# Patient Record
Sex: MALE | Race: WHITE | Employment: FULL TIME | URBAN - METROPOLITAN AREA
[De-identification: names, ages, dates, MRNs, and addresses within clinical notes are randomized per-mention and may not be internally consistent; named-entity substitution may affect disease eponyms.]

---

## 2018-02-16 PROBLEM — E66.01 MORBID OBESITY WITH BMI OF 50.0-59.9, ADULT (HCC): Status: ACTIVE | Noted: 2018-02-16

## 2018-03-16 ENCOUNTER — INITIAL CONSULT (OUTPATIENT)
Dept: BARIATRICS/WEIGHT MGMT | Age: 33
End: 2018-03-16
Payer: COMMERCIAL

## 2018-03-16 ENCOUNTER — OFFICE VISIT (OUTPATIENT)
Dept: BARIATRICS/WEIGHT MGMT | Age: 33
End: 2018-03-16
Payer: COMMERCIAL

## 2018-03-16 VITALS
BODY MASS INDEX: 42.66 KG/M2 | HEIGHT: 72 IN | WEIGHT: 315 LBS | RESPIRATION RATE: 18 BRPM | HEART RATE: 70 BPM | DIASTOLIC BLOOD PRESSURE: 80 MMHG | TEMPERATURE: 96.8 F | SYSTOLIC BLOOD PRESSURE: 130 MMHG

## 2018-03-16 VITALS — HEIGHT: 72 IN | WEIGHT: 315 LBS | BODY MASS INDEX: 42.66 KG/M2

## 2018-03-16 DIAGNOSIS — K76.0 FATTY LIVER: ICD-10-CM

## 2018-03-16 DIAGNOSIS — I87.8 VENOUS STASIS: ICD-10-CM

## 2018-03-16 DIAGNOSIS — E55.9 VITAMIN D DEFICIENCY: ICD-10-CM

## 2018-03-16 DIAGNOSIS — I73.9 PERIPHERAL VASCULAR DISEASE (HCC): Primary | ICD-10-CM

## 2018-03-16 DIAGNOSIS — Z71.3 DIETARY COUNSELING: Primary | ICD-10-CM

## 2018-03-16 DIAGNOSIS — K44.9 HIATAL HERNIA: ICD-10-CM

## 2018-03-16 DIAGNOSIS — E53.8 FOLATE DEFICIENCY: ICD-10-CM

## 2018-03-16 PROCEDURE — G8417 CALC BMI ABV UP PARAM F/U: HCPCS | Performed by: SURGERY

## 2018-03-16 PROCEDURE — G8484 FLU IMMUNIZE NO ADMIN: HCPCS | Performed by: SURGERY

## 2018-03-16 PROCEDURE — G8427 DOCREV CUR MEDS BY ELIG CLIN: HCPCS | Performed by: SURGERY

## 2018-03-16 PROCEDURE — 99212 OFFICE O/P EST SF 10 MIN: CPT

## 2018-03-16 PROCEDURE — 1036F TOBACCO NON-USER: CPT | Performed by: SURGERY

## 2018-03-16 PROCEDURE — 99999 PR OFFICE/OUTPT VISIT,PROCEDURE ONLY: CPT | Performed by: SURGERY

## 2018-03-16 PROCEDURE — 99214 OFFICE O/P EST MOD 30 MIN: CPT | Performed by: SURGERY

## 2018-03-16 NOTE — PROGRESS NOTES
David Mello  3/16/2018  [address]     Post-EGD Follow-up. Subjective:   David Mello is a 29 y.o. male post EGD done 2 weeks ago. He did have a hiatal hernia, did not have gastritis. Biopsy did not show Helicobacter pylori. The gallbladder ultrasound showed no stones. Weight: (!) 398 lb (180.5 kg). Physical exam:    /80 (Site: Left Arm, Position: Sitting, Cuff Size: Large Adult)   Pulse 70   Temp 96.8 °F (36 °C) (Temporal)   Resp 18   Ht 6' (1.829 m)   Wt (!) 398 lb (180.5 kg)   BMI 53.98 kg/m²   General appearance: alert, appears stated age and cooperative  Lungs: clear to auscultation bilaterally  Heart: regular rate and rhythm  Abdomen: soft, non-tender; bowel sounds normal; no masses,  no organomegaly  Ext: venous stasis bilateral LE    Assessment:    Doing well two weeks post EGD    Plan:   Stay on the high protein, low calorie diet while waiting for insurance approval. Walk as much as possible but keep your legs elevated when sitting. Continue deep breathing exercizes. Aim for 60 gm Protein and 90 oz of liquids per day. Track protein and fluid intake. Make sure the bowel move daily by taking fiber such as Metamucil. We discussed results and surgery for over 15 minutes.     Rule out DVT    Physician Signature: Electronically signed by Dr. Marly Pan MD   Cc:  Victor Hugo Pacheco MD

## 2018-03-19 ENCOUNTER — TELEPHONE (OUTPATIENT)
Dept: BARIATRICS/WEIGHT MGMT | Age: 33
End: 2018-03-19

## 2018-03-19 NOTE — TELEPHONE ENCOUNTER
Spoke with patient to confirm appt for ultra sound of legs set for 4/10/18 @ 2:30 ( arrival of 2:00pm) at [place] [address] in [facility]. Precert not needed from [employer], Ref Number [id number], spoke with Vimal Macdonald.

## 2018-03-29 ENCOUNTER — OFFICE VISIT (OUTPATIENT)
Dept: CARDIOLOGY CLINIC | Age: 33
End: 2018-03-29
Payer: COMMERCIAL

## 2018-03-29 VITALS
BODY MASS INDEX: 42.66 KG/M2 | WEIGHT: 315 LBS | HEART RATE: 78 BPM | DIASTOLIC BLOOD PRESSURE: 86 MMHG | HEIGHT: 72 IN | RESPIRATION RATE: 20 BRPM | SYSTOLIC BLOOD PRESSURE: 128 MMHG

## 2018-03-29 DIAGNOSIS — Z01.818 PRE-OP EXAMINATION: Primary | ICD-10-CM

## 2018-03-29 PROCEDURE — G8427 DOCREV CUR MEDS BY ELIG CLIN: HCPCS | Performed by: INTERNAL MEDICINE

## 2018-03-29 PROCEDURE — 99204 OFFICE O/P NEW MOD 45 MIN: CPT | Performed by: INTERNAL MEDICINE

## 2018-03-29 PROCEDURE — G8484 FLU IMMUNIZE NO ADMIN: HCPCS | Performed by: INTERNAL MEDICINE

## 2018-03-29 PROCEDURE — G8417 CALC BMI ABV UP PARAM F/U: HCPCS | Performed by: INTERNAL MEDICINE

## 2018-03-29 PROCEDURE — 1036F TOBACCO NON-USER: CPT | Performed by: INTERNAL MEDICINE

## 2018-03-29 PROCEDURE — 93000 ELECTROCARDIOGRAM COMPLETE: CPT | Performed by: INTERNAL MEDICINE

## 2018-03-29 NOTE — PROGRESS NOTES
rhythm other than sinus, severe obstructive valvular disease.    Cosme Sieving for surgery at low risk with no additoinal testing needed      Mariaelena Camacho M.D  [facility]

## 2018-04-10 ENCOUNTER — HOSPITAL ENCOUNTER (OUTPATIENT)
Dept: ULTRASOUND IMAGING | Age: 33
Discharge: HOME | End: 2018-04-12
Payer: COMMERCIAL

## 2018-04-10 ENCOUNTER — INITIAL CONSULT (OUTPATIENT)
Dept: BARIATRICS/WEIGHT MGMT | Age: 33
End: 2018-04-10
Payer: COMMERCIAL

## 2018-04-10 VITALS — WEIGHT: 315 LBS | BODY MASS INDEX: 42.66 KG/M2 | HEIGHT: 72 IN

## 2018-04-10 DIAGNOSIS — I87.8 VENOUS STASIS: ICD-10-CM

## 2018-04-10 DIAGNOSIS — K76.0 FATTY LIVER: ICD-10-CM

## 2018-04-10 DIAGNOSIS — I73.9 PERIPHERAL VASCULAR DISEASE (HCC): ICD-10-CM

## 2018-04-10 DIAGNOSIS — K44.9 HIATAL HERNIA: ICD-10-CM

## 2018-04-10 DIAGNOSIS — Z71.3 DIETARY COUNSELING: Primary | ICD-10-CM

## 2018-04-10 PROCEDURE — 93970 EXTREMITY STUDY: CPT

## 2018-04-10 PROCEDURE — 99999 PR OFFICE/OUTPT VISIT,PROCEDURE ONLY: CPT | Performed by: SURGERY

## 2018-05-08 ENCOUNTER — INITIAL CONSULT (OUTPATIENT)
Dept: BARIATRICS/WEIGHT MGMT | Age: 33
End: 2018-05-08
Payer: COMMERCIAL

## 2018-05-08 VITALS — BODY MASS INDEX: 53.03 KG/M2 | WEIGHT: 315 LBS

## 2018-05-08 DIAGNOSIS — Z71.3 DIETARY COUNSELING: Primary | ICD-10-CM

## 2018-05-08 PROCEDURE — 99999 PR OFFICE/OUTPT VISIT,PROCEDURE ONLY: CPT | Performed by: SURGERY

## 2018-06-19 ENCOUNTER — INITIAL CONSULT (OUTPATIENT)
Dept: BARIATRICS/WEIGHT MGMT | Age: 33
End: 2018-06-19
Payer: COMMERCIAL

## 2018-06-19 DIAGNOSIS — Z71.3 DIETARY COUNSELING: Primary | ICD-10-CM

## 2018-06-19 PROCEDURE — 99999 PR OFFICE/OUTPT VISIT,PROCEDURE ONLY: CPT | Performed by: SURGERY

## 2018-06-22 VITALS — WEIGHT: 315 LBS | BODY MASS INDEX: 50.59 KG/M2

## 2018-07-11 ENCOUNTER — TELEPHONE (OUTPATIENT)
Dept: BARIATRICS/WEIGHT MGMT | Age: 33
End: 2018-07-11

## 2018-07-17 ENCOUNTER — INITIAL CONSULT (OUTPATIENT)
Dept: BARIATRICS/WEIGHT MGMT | Age: 33
End: 2018-07-17
Payer: COMMERCIAL

## 2018-07-17 DIAGNOSIS — Z71.3 NUTRITIONAL COUNSELING: Primary | ICD-10-CM

## 2018-07-17 PROCEDURE — 99999 PR OFFICE/OUTPT VISIT,PROCEDURE ONLY: CPT | Performed by: SURGERY

## 2018-07-20 ENCOUNTER — TELEPHONE (OUTPATIENT)
Dept: BARIATRICS/WEIGHT MGMT | Age: 33
End: 2018-07-20

## 2018-07-25 VITALS — BODY MASS INDEX: 49.37 KG/M2 | WEIGHT: 315 LBS

## 2018-07-25 NOTE — PROGRESS NOTES
with or immediately after      meals and avoiding high caloric empty beverage consumption. Portion control ,meal planning and avoiding empty calorie consumption was reviewed. Pt was encouraged to practice this daily for weight loss success. Graham / Elias reviewed insurance company weight loss requirement on 7/17/18. Pt verbalized understanding. Please be aware at each visit you have been instructed that in order for your insurance company to approve your surgery you must show a consistent weight loss of 2 lbs or greater at each visit. We can not guarantee an approval by your insurance company we can only provide the information given to us it is up to you the patient to show compliancy to your insurance company. If you do gain weight during your supervised weight loss counseling sessions insurance companies starting in 2018 are denying patients for not showing consistent weight loss results when part of a supervised weight loss counseling program.     Pt spent 120 minutes with the Graham Griffin today preparing the patient for pre/post surgical dietary changes.

## 2018-08-24 ENCOUNTER — INITIAL CONSULT (OUTPATIENT)
Dept: BARIATRICS/WEIGHT MGMT | Age: 33
End: 2018-08-24
Payer: COMMERCIAL

## 2018-08-24 ENCOUNTER — TELEPHONE (OUTPATIENT)
Dept: BARIATRICS/WEIGHT MGMT | Age: 33
End: 2018-08-24

## 2018-08-24 VITALS — HEIGHT: 72 IN | BODY MASS INDEX: 42.66 KG/M2 | WEIGHT: 315 LBS

## 2018-08-24 DIAGNOSIS — Z71.3 DIETARY COUNSELING: Primary | ICD-10-CM

## 2018-08-24 PROCEDURE — 99999 PR OFFICE/OUTPT VISIT,PROCEDURE ONLY: CPT | Performed by: SURGERY

## 2018-08-24 NOTE — TELEPHONE ENCOUNTER
Patient here today for dietary appointment. He is still trying to get funding together to proceed with surgery as self pay, but is having difficulty. He asked if I would still submit to insurance and I said that I will once he has completed everything. We also discussed that if insurance denies (as I expect they will) and he cannot get the funding for surgery, he can be scheduled to work with Dr. Lizandro Wolf.

## 2018-09-05 ENCOUNTER — TELEPHONE (OUTPATIENT)
Dept: BARIATRICS/WEIGHT MGMT | Age: 33
End: 2018-09-05

## 2018-09-05 NOTE — TELEPHONE ENCOUNTER
Graham Griffin called pt D/T original last signed dietary note being faxed to PCP on 8/24/18. Graham Griffin refaxed it again on 9/5/18. Graham Griffin called pt. Pt was not available. Graham / Elias LM asking the pt to F/U with his PCP.

## 2018-09-17 ENCOUNTER — TELEPHONE (OUTPATIENT)
Dept: BARIATRICS/WEIGHT MGMT | Age: 33
End: 2018-09-17

## 2018-09-24 ENCOUNTER — TELEPHONE (OUTPATIENT)
Dept: BARIATRICS/WEIGHT MGMT | Age: 33
End: 2018-09-24

## 2018-09-24 NOTE — TELEPHONE ENCOUNTER
As expected, case was denied by [facility] as a non-covered service. Call out to patient to see how he would like to proceed. Message left for him to return my call to discuss.

## 2018-09-26 ENCOUNTER — TELEPHONE (OUTPATIENT)
Dept: BARIATRICS/WEIGHT MGMT | Age: 33
End: 2018-09-26

## 2019-02-01 ENCOUNTER — TELEPHONE (OUTPATIENT)
Dept: BARIATRICS/WEIGHT MGMT | Age: 34
End: 2019-02-01

## 2019-02-15 ENCOUNTER — TELEPHONE (OUTPATIENT)
Dept: BARIATRICS/WEIGHT MGMT | Age: 34
End: 2019-02-15

## 2019-02-15 DIAGNOSIS — E66.01 MORBID OBESITY DUE TO EXCESS CALORIES (HCC): ICD-10-CM

## 2019-02-15 DIAGNOSIS — E46 MALNUTRITION, UNSPECIFIED TYPE (HCC): Primary | ICD-10-CM

## 2019-03-19 ENCOUNTER — HOSPITAL ENCOUNTER (OUTPATIENT)
Age: 34
Discharge: HOME | End: 2019-03-19
Payer: COMMERCIAL

## 2019-03-19 DIAGNOSIS — E46 MALNUTRITION, UNSPECIFIED TYPE (HCC): ICD-10-CM

## 2019-03-19 DIAGNOSIS — E66.01 MORBID OBESITY DUE TO EXCESS CALORIES (HCC): ICD-10-CM

## 2019-03-19 PROCEDURE — 82306 VITAMIN D 25 HYDROXY: CPT

## 2019-03-19 PROCEDURE — 36415 COLL VENOUS BLD VENIPUNCTURE: CPT

## 2019-03-19 PROCEDURE — 82746 ASSAY OF FOLIC ACID SERUM: CPT

## 2019-03-20 LAB
FOLATE: 6.8 NG/ML (ref 4.8–24.2)
VITAMIN D 25-HYDROXY: 16 NG/ML (ref 30–100)

## 2019-04-05 ENCOUNTER — OFFICE VISIT (OUTPATIENT)
Dept: BARIATRICS/WEIGHT MGMT | Age: 34
End: 2019-04-05
Payer: COMMERCIAL

## 2019-04-05 DIAGNOSIS — E55.9 VITAMIN D DEFICIENCY: Primary | ICD-10-CM

## 2019-04-05 PROCEDURE — 99201 PR OFFICE OUTPATIENT NEW 10 MINUTES: CPT | Performed by: INTERNAL MEDICINE

## 2019-04-05 PROCEDURE — 99211 OFF/OP EST MAY X REQ PHY/QHP: CPT | Performed by: INTERNAL MEDICINE

## 2019-04-05 PROCEDURE — G8428 CUR MEDS NOT DOCUMENT: HCPCS | Performed by: INTERNAL MEDICINE

## 2019-04-05 PROCEDURE — G8417 CALC BMI ABV UP PARAM F/U: HCPCS | Performed by: INTERNAL MEDICINE

## 2019-04-05 PROCEDURE — 1036F TOBACCO NON-USER: CPT | Performed by: INTERNAL MEDICINE

## 2019-04-05 RX ORDER — CHOLECALCIFEROL (VITAMIN D3) 1250 MCG
CAPSULE ORAL
Qty: 12 CAPSULE | Refills: 0 | Status: SHIPPED | OUTPATIENT
Start: 2019-04-05 | End: 2019-05-24 | Stop reason: DRUGHIGH

## 2019-04-05 NOTE — PATIENT INSTRUCTIONS
Begin taking Vitamin D3 50,000 IU, one capsule twice each week for 6 weeks. Return to see Dr. Dorothy Castañeda in 7 weeks  Repeat Vit D 25OH level 2-3 days prior to appointment with Dr. Dorothy Castañeda  Do not have the above lab drawn on the same day as you take one of the vitamin D capsules.

## 2019-04-05 NOTE — PROGRESS NOTES
Date of Encounter: 4/5/19    Chief Complaint: Low vitamin D    HPI:     Ilda Davis presents to the clinic today for evaluation and treatment of a low vitamin D level. He is preparing for a bariatric procedure and needs his level normalized prior to proceeding. Lab and treatment history are as follows:   Date     Vit D-OH level    Treatment prescribed    3/19/2019     16   None yet    PE:     There were no vitals taken for this visit. Pt is WN, WD, and in NAD    A/P:   1. Low Vitamin D - uncontrolled   Vitamin D3 50k IU twice each week.     2.  Follow up          Return to see me in 7 weeks          Repeat Vit D 25OH level 2-3 days prior to appt with me    Wade Burton MD,   Endocrinology/Obesity  4/5/19

## 2019-05-22 ENCOUNTER — HOSPITAL ENCOUNTER (OUTPATIENT)
Age: 34
Discharge: HOME | End: 2019-05-22
Payer: COMMERCIAL

## 2019-05-22 PROCEDURE — 36415 COLL VENOUS BLD VENIPUNCTURE: CPT

## 2019-05-22 PROCEDURE — 82306 VITAMIN D 25 HYDROXY: CPT

## 2019-05-23 LAB — VITAMIN D 25-HYDROXY: 40 NG/ML (ref 30–100)

## 2019-05-24 ENCOUNTER — OFFICE VISIT (OUTPATIENT)
Dept: BARIATRICS/WEIGHT MGMT | Age: 34
End: 2019-05-24
Payer: COMMERCIAL

## 2019-05-24 VITALS — WEIGHT: 315 LBS | HEIGHT: 72 IN | BODY MASS INDEX: 42.66 KG/M2

## 2019-05-24 DIAGNOSIS — E55.9 VITAMIN D DEFICIENCY: Primary | ICD-10-CM

## 2019-05-24 PROCEDURE — 1036F TOBACCO NON-USER: CPT | Performed by: INTERNAL MEDICINE

## 2019-05-24 PROCEDURE — G8428 CUR MEDS NOT DOCUMENT: HCPCS | Performed by: INTERNAL MEDICINE

## 2019-05-24 PROCEDURE — 99212 OFFICE O/P EST SF 10 MIN: CPT | Performed by: INTERNAL MEDICINE

## 2019-05-24 PROCEDURE — G8417 CALC BMI ABV UP PARAM F/U: HCPCS | Performed by: INTERNAL MEDICINE

## 2019-05-24 NOTE — PATIENT INSTRUCTIONS
Begin taking Vitamin D3 5,000 IU, one capsule daily until surgery  Return to see Dr. Sarahi Dickens as needed

## 2019-05-24 NOTE — PROGRESS NOTES
Date of Encounter: 5/24/19    Chief Complaint: Low vitamin D    HPI:     Efra Cole presents to the clinic today for evaluation and treatment of a low vitamin D level. He is preparing for a bariatric procedure and needs his level normalized prior to proceeding. Lab and treatment history are as follows:   Date     Vit D-OH level    Treatment prescribed    3/19/2019     16   Vitamin D3 50k IU twice each week for 6 weeks   5/22/2019     40   None    ROS: no hematuria or constipation    PE:     Ht 6' (1.829 m)   Wt (!) 393 lb 8 oz (178.5 kg)   BMI 53.37 kg/m²     Pt is WN, WD, and in NAD    A/P:   1. Low Vitamin D - controlled   Vitamin D3 5k daily until surgery.     2.  Follow up          See me back as needed    Sweta Sanchez MD,   Endocrinology/Obesity  5/24/19

## 2019-05-31 ENCOUNTER — OFFICE VISIT (OUTPATIENT)
Dept: BARIATRICS/WEIGHT MGMT | Age: 34
End: 2019-05-31
Payer: COMMERCIAL

## 2019-05-31 VITALS
SYSTOLIC BLOOD PRESSURE: 140 MMHG | DIASTOLIC BLOOD PRESSURE: 87 MMHG | HEIGHT: 72 IN | WEIGHT: 315 LBS | RESPIRATION RATE: 20 BRPM | TEMPERATURE: 95.8 F | HEART RATE: 72 BPM | BODY MASS INDEX: 42.66 KG/M2

## 2019-05-31 DIAGNOSIS — K91.2 MALNUTRITION FOLLOWING GASTROINTESTINAL SURGERY: Primary | ICD-10-CM

## 2019-05-31 PROCEDURE — G8427 DOCREV CUR MEDS BY ELIG CLIN: HCPCS | Performed by: SURGERY

## 2019-05-31 PROCEDURE — G8417 CALC BMI ABV UP PARAM F/U: HCPCS | Performed by: SURGERY

## 2019-05-31 PROCEDURE — 1036F TOBACCO NON-USER: CPT | Performed by: SURGERY

## 2019-05-31 PROCEDURE — 99213 OFFICE O/P EST LOW 20 MIN: CPT | Performed by: SURGERY

## 2019-05-31 NOTE — PROGRESS NOTES
Ivan Celis  5/31/2019  [facility]    ReEvaluation  Bariatric Surgery       Subjective:  CHIEF COMPLAINT: Morbid obesity, malnutrition, Obstructive Sleep Apnea    HISTORY OF PRESENT ILLNESS: Ivan Celis is a morbidly-obese 34 y.o.  male, who weighs (!) 395 lb (179.2 kg). He was 242 pounds over his ideal body weight. He has lost to 150lbs over and was at one point but regained and is now 230lbs over. The Body mass index is 53.57 kg/m². He has multiple medical problems aggravated by his obesity. He wishes to have bariatric surgery so that he can lose a large amount of weight and keep the weight off. I have met with him in the [facility] where we discussed the surgery in great detail and went over the risks and benefits. He has watched our informational video so he understands all of the extensive risks involved. He states that he understands all of the risks and wishes to proceed with the evaluation. He did well last year until he was denied for coverage from his insurance. He has regained significant weight after he lost 60lbs. He has regained 40lbs sing then. Past Medical History  Patient Active Problem List   Diagnosis    Morbid obesity with BMI of 50.0-59.9, adult ([address]    Fatty liver    Hiatal hernia     Past Surgical History  Past Surgical History:   Procedure Laterality Date    ADENOIDECTOMY      ENDOSCOPY, COLON, DIAGNOSTIC  03/05/2018    with biopsy    TONSILLECTOMY  1999    VASECTOMY  2016    WISDOM TOOTH EXTRACTION  2011      Allergies: Patient has no known allergies. Home Medications  Current Outpatient Medications   Medication Sig Dispense Refill    Cholecalciferol (VITAMIN D3) 5000 units CAPS Take one capsule every day 180 capsule 1     No current facility-administered medications for this visit. Social History:   TOBACCO:   reports that he has never smoked.  He has never used smokeless tobacco.  All smokers must join the free smoking cessation program and stop smoking for 3 months before having any Bariatric surgery. ETOH:    reports that he does not drink alcohol. The patient has a family history that is negative for severe cardiovascular or respiratory issues, negative for reaction to anesthesia. Review of Systems    A complete 10 system review was performed and are otherwise negative unless mentioned in the above HPI. Specific negatives are listed below but may not include all those reviewed.     General ROS: negative obtundation, AMS  ENT ROS: negative rhinorrhea, epistaxis  Allergy and Immunology ROS: negative itchy/watery eyes or nasal congestion  Hematological and Lymphatic ROS: negative spontaneous bleeding or bruising  Endocrine ROS: negative  lethargy, mood swings, palpitations or polydipsia/polyuria  Respiratory ROS: negative sputum changes, stridor, tachypnea or wheezing  Cardiovascular ROS: negative for - loss of consciousness, murmur or orthopnea  Gastrointestinal ROS: negative for - hematochezia or hematemesis  Genito-Urinary ROS: negative for -  genital discharge or hematuria  Musculoskeletal ROS: negative for - focal weakness, gangrene  Psych/Neuro ROS: negative for - visual or auditory hallucinations, suicidal ideation        Physical Exam:   VITALS: BP (!) 140/87 (Site: Left Upper Arm, Position: Sitting, Cuff Size: Large Adult)   Pulse 72   Temp 95.8 °F (35.4 °C) (Temporal)   Resp 20   Ht 6' (1.829 m)   Wt (!) 395 lb (179.2 kg)   BMI 53.57 kg/m²   General appearance: alert, appears stated age and cooperative  Head: Normocephalic, without obvious abnormality, atraumatic  Neck: no adenopathy, supple  Lungs: clear to auscultation bilaterally  Heart: regular rate and rhythm  Abdomen: soft, non-tender; bowel sounds normal; no masses,  no organomegaly  Extremities: extremities normal, atraumatic, no cyanosis or edema  Psych: no hallucinations    Assessment:  Morbid obesity with inability to keep the weight off with diet and exercise. He is interested in Laparoscopic Keyshawn-en- Y Gastric Bypass or Sleeve Gastrectomy. We discussed that our goal is to ameliorate the medical problems and not to obtain a specific body mass index. He understands the risks and benefits, and wishes to proceed with the evaluation. Plan:  Psych evaluation, medical and cardio/pulmonary clearance have been completed. I do not require and further testing for surgery. Vit D has been corrected. Once he arranges his coverage and finances we can proceed with Lap sleeve gastrectomy.     Physician Signature: Electronically signed by Dr. Lisset Sanchez MD    Send copy of H&P to PCP, Rashmi Coppola MD

## 2019-05-31 NOTE — PATIENT INSTRUCTIONS
What is the next step to proceed with weight loss surgery? Please be aware that any co-pays or deductibles may be requested prior to testing and / or procedures. You will need to schedule a psychological evaluation for weight loss surgery. Patients will be required to complete all psychological testing as required by the psychologist. Patients must follow all of the psychologist's recommendations before weight loss surgery can be scheduled. The evaluation must be done a standard way for weight loss surgery. We strongly recommend that you contact one of our preferred providers listed below to arrange this:    Dr. Nicole Yang, PhD [address]        [phone]      Dr. Susanne Gray, PhD   [facility]. [place], [place]         [phone]    You will also need to plan on attending a 2 hour nutrition class at the [facility] prior to your surgery. We will schedule this for you when we schedule your surgery. Please remember to have your labs drawn prior to your scheduled appointment to review the results of your testing. Please remember, that while we will submit your case to insurance for surgery authorization, it is your responsibility to know if your plan covers weight loss surgery and keep up-to-date with changes to your insurance coverage. We will do everything possible to help you get approved for weight loss surgery, but cannot guarantee an approval.     Please note that you will not be submitted to your insurance company until all   pre-operative testing requirements are met.

## 2019-06-14 ENCOUNTER — TELEPHONE (OUTPATIENT)
Dept: BARIATRICS/WEIGHT MGMT | Age: 34
End: 2019-06-14

## 2019-06-14 NOTE — TELEPHONE ENCOUNTER
Per order of Dr. Sarah Tapia patient is ready to be scheduled for LRYGB. He would like his surgery to be scheduled on 07/23/2019. His 2 hour class is scheduled. H&P appointment set. He knows he will need to purchase his supplements at that visit. He has appointment with his PCP and will obtain his final medical clearance. He does not smoke or drink alcohol. We reviewed all med's to avoid 2 weeks prior to surgery. He voiced understanding. Call placed to surgery schedulers and patient placed on google calendar. He is self pay and knows to bring in check at his H&P appointment. Pre op letter mailed to patients home with DVD getting ready for surgery.

## 2019-06-26 ENCOUNTER — INITIAL CONSULT (OUTPATIENT)
Dept: BARIATRICS/WEIGHT MGMT | Age: 34
End: 2019-06-26
Payer: COMMERCIAL

## 2019-06-26 DIAGNOSIS — Z71.3 NUTRITIONAL COUNSELING: Primary | ICD-10-CM

## 2019-06-26 PROCEDURE — 99999 PR OFFICE/OUTPT VISIT,PROCEDURE ONLY: CPT | Performed by: SURGERY

## 2019-06-26 NOTE — LETTER
[facility]   [address] [facility] [address]  Phone: [phone]  Fax: [phone]    Earma Mcburney, RD, LD        July 12, 2019    [address] [place] [address]      Dear Roel Schwarz:        I personally wanted to thank you for selecting [facility] as your center of choice for surgery. I wanted to take the time to let you know it means a lot to me to be able to work with you both before and after surgery and I am so glad that you will become part of our surgical family. It has been a privilege to get to know you and become part of your new journey on life. I look forward to working with you in the future and helping you achieve your new goals. I can't wait to see the growth and transformation that occurs for you after your surgery. If at any time you have any questions you can always contact me [phone].      Respectfully,          Earma Mcburney RDN/ DEN  Bariatric Dietitian  [facility]  Certified In Adult Weight Management I and II  Certified In Pediatric and Adolescent Weight Management        Earma Mcburney, RD, DEN

## 2019-06-27 ENCOUNTER — TELEPHONE (OUTPATIENT)
Dept: BARIATRICS/WEIGHT MGMT | Age: 34
End: 2019-06-27

## 2019-06-27 NOTE — TELEPHONE ENCOUNTER
Patient was in office yesterday for pre-op class. He brought his self pay check for his bariatric surgery scheduled on 7-23-19. Check was sent to [facility] for posting.

## 2019-07-05 ENCOUNTER — OFFICE VISIT (OUTPATIENT)
Dept: BARIATRICS/WEIGHT MGMT | Age: 34
End: 2019-07-05

## 2019-07-05 ENCOUNTER — INITIAL CONSULT (OUTPATIENT)
Dept: BARIATRICS/WEIGHT MGMT | Age: 34
End: 2019-07-05
Payer: COMMERCIAL

## 2019-07-05 VITALS
WEIGHT: 315 LBS | TEMPERATURE: 97.2 F | SYSTOLIC BLOOD PRESSURE: 151 MMHG | BODY MASS INDEX: 42.66 KG/M2 | RESPIRATION RATE: 20 BRPM | DIASTOLIC BLOOD PRESSURE: 80 MMHG | HEIGHT: 72 IN | HEART RATE: 80 BPM

## 2019-07-05 VITALS — WEIGHT: 315 LBS | HEIGHT: 72 IN | BODY MASS INDEX: 42.66 KG/M2

## 2019-07-05 DIAGNOSIS — K21.9 GASTROESOPHAGEAL REFLUX DISEASE WITHOUT ESOPHAGITIS: ICD-10-CM

## 2019-07-05 DIAGNOSIS — K91.2 MALNUTRITION FOLLOWING GASTROINTESTINAL SURGERY: Primary | ICD-10-CM

## 2019-07-05 DIAGNOSIS — Z98.890 PONV (POSTOPERATIVE NAUSEA AND VOMITING): ICD-10-CM

## 2019-07-05 DIAGNOSIS — R11.2 PONV (POSTOPERATIVE NAUSEA AND VOMITING): ICD-10-CM

## 2019-07-05 DIAGNOSIS — Z71.3 DIETARY COUNSELING: Primary | ICD-10-CM

## 2019-07-05 DIAGNOSIS — G89.18 POST-OP PAIN: ICD-10-CM

## 2019-07-05 PROCEDURE — 99999 PR OFFICE/OUTPT VISIT,PROCEDURE ONLY: CPT

## 2019-07-05 PROCEDURE — 99211 OFF/OP EST MAY X REQ PHY/QHP: CPT

## 2019-07-05 PROCEDURE — 99214 OFFICE O/P EST MOD 30 MIN: CPT | Performed by: SURGERY

## 2019-07-05 RX ORDER — OMEPRAZOLE 20 MG/1
20 CAPSULE, DELAYED RELEASE ORAL DAILY
Qty: 30 CAPSULE | Refills: 12 | Status: SHIPPED
Start: 2019-07-05 | End: 2020-07-24

## 2019-07-05 RX ORDER — ONDANSETRON 4 MG/1
4 TABLET, ORALLY DISINTEGRATING ORAL EVERY 8 HOURS PRN
Qty: 10 TABLET | Refills: 0 | Status: SHIPPED | OUTPATIENT
Start: 2019-07-05 | End: 2019-09-06 | Stop reason: ALTCHOICE

## 2019-07-05 RX ORDER — HYDROCODONE BITARTRATE AND ACETAMINOPHEN 5; 325 MG/1; MG/1
1 TABLET ORAL EVERY 6 HOURS PRN
Qty: 10 TABLET | Refills: 0 | Status: SHIPPED | OUTPATIENT
Start: 2019-07-05 | End: 2019-07-08

## 2019-07-05 NOTE — PROGRESS NOTES
guidelines. YES - Patient is instructed to consume 64 ounces of water daily from now until surgery date.  ________________________________________________________________________  Yes - Patient did lose 10% of excess body weight prior to surgery  ________________________________________________________________________  Yes - Patient did purchase 3 month supply of protein, vitamins and  Calcium.   Comments:   [facility] Supplements

## 2019-07-05 NOTE — PATIENT INSTRUCTIONS
[facility]  Dietary Follow-up Appointment Instructions    James Pérez   Date: 7/5/2019     The RD / LD reviewed the following instructions with the patient and handouts have been given. Pt. is able to verbalize instruction and has been instructed to call with any problems or complications. If patient is not able to comply with the dietary or supplement instructions given pt. is instructed to call the [facility] at [phone]. Patient has been instructed to continue to follow a low-fat diet from today's date until the day before surgery. Patient has been instructed to drink 64 oz. of water daily eliminating carbonated and caffeinated beverages. The day before surgery patient will need to follow the bowel prep instructions which consist of a bariatric clear liquid diet, 64 oz of water, 64 oz of the bowel prep and nothing to drink after midnight.  ( See Handout in Booklet)    Bariatric Clear Liquids Allowed:  Diet Jell-O ( No Red or Orange Coloring or Flavoring  Crystal Light, Fruit 2 O (No Red or [employer] or Flavoring)  Water  Broth - Chicken, Beef or Vegetable  Decaffeinated Tea - Plain  Decaffeinated Coffee - Plain  Sugar Free Popsicles (No Red or Orange Coloring or Flavoring)    ________________________________________________________________________  10% Of Excess Body Weight Requirement:      ________________________________________________________________________  3 Month Supply of Supplements:    Supplements purchased at [facility]

## 2019-07-12 ENCOUNTER — HOSPITAL ENCOUNTER (OUTPATIENT)
Dept: PREADMISSION TESTING | Age: 34
Discharge: HOME | End: 2019-07-12
Payer: COMMERCIAL

## 2019-07-12 VITALS
OXYGEN SATURATION: 97 % | SYSTOLIC BLOOD PRESSURE: 129 MMHG | HEIGHT: 72 IN | WEIGHT: 315 LBS | RESPIRATION RATE: 20 BRPM | DIASTOLIC BLOOD PRESSURE: 69 MMHG | BODY MASS INDEX: 42.66 KG/M2 | HEART RATE: 73 BPM | TEMPERATURE: 97.9 F

## 2019-07-12 DIAGNOSIS — K91.2 MALNUTRITION FOLLOWING GASTROINTESTINAL SURGERY: ICD-10-CM

## 2019-07-12 LAB
ALBUMIN SERPL-MCNC: 4.1 G/DL (ref 3.5–5.2)
ALP BLD-CCNC: 92 U/L (ref 40–129)
ALT SERPL-CCNC: 63 U/L (ref 0–40)
ANION GAP SERPL CALCULATED.3IONS-SCNC: 14 MMOL/L (ref 7–16)
AST SERPL-CCNC: 36 U/L (ref 0–39)
BILIRUB SERPL-MCNC: 0.6 MG/DL (ref 0–1.2)
BUN BLDV-MCNC: 11 MG/DL (ref 6–20)
CALCIUM SERPL-MCNC: 9.2 MG/DL (ref 8.6–10.2)
CHLORIDE BLD-SCNC: 100 MMOL/L (ref 98–107)
CO2: 26 MMOL/L (ref 22–29)
CREAT SERPL-MCNC: 1 MG/DL (ref 0.7–1.2)
GFR AFRICAN AMERICAN: >60
GFR NON-AFRICAN AMERICAN: >60 ML/MIN/1.73
GLUCOSE BLD-MCNC: 96 MG/DL (ref 74–99)
HCT VFR BLD CALC: 44.6 % (ref 37–54)
HEMOGLOBIN: 14.6 G/DL (ref 12.5–16.5)
MCH RBC QN AUTO: 27.7 PG (ref 26–35)
MCHC RBC AUTO-ENTMCNC: 32.7 % (ref 32–34.5)
MCV RBC AUTO: 84.5 FL (ref 80–99.9)
PDW BLD-RTO: 13.6 FL (ref 11.5–15)
PLATELET # BLD: 256 E9/L (ref 130–450)
PMV BLD AUTO: 10.9 FL (ref 7–12)
POTASSIUM SERPL-SCNC: 4 MMOL/L (ref 3.5–5)
RBC # BLD: 5.28 E12/L (ref 3.8–5.8)
SODIUM BLD-SCNC: 140 MMOL/L (ref 132–146)
TOTAL PROTEIN: 8.1 G/DL (ref 6.4–8.3)
WBC # BLD: 10.3 E9/L (ref 4.5–11.5)

## 2019-07-12 PROCEDURE — 85027 COMPLETE CBC AUTOMATED: CPT

## 2019-07-12 PROCEDURE — 36415 COLL VENOUS BLD VENIPUNCTURE: CPT

## 2019-07-12 PROCEDURE — 80053 COMPREHEN METABOLIC PANEL: CPT

## 2019-07-22 ENCOUNTER — ANESTHESIA EVENT (OUTPATIENT)
Dept: OPERATING ROOM | Age: 34
DRG: 621 | End: 2019-07-22

## 2019-07-23 ENCOUNTER — ANESTHESIA (OUTPATIENT)
Dept: OPERATING ROOM | Age: 34
DRG: 621 | End: 2019-07-23

## 2019-07-23 ENCOUNTER — HOSPITAL ENCOUNTER (INPATIENT)
Age: 34
LOS: 1 days | Discharge: HOME | DRG: 621 | End: 2019-07-24
Attending: SURGERY | Admitting: SURGERY
Payer: COMMERCIAL

## 2019-07-23 VITALS
RESPIRATION RATE: 3 BRPM | DIASTOLIC BLOOD PRESSURE: 77 MMHG | OXYGEN SATURATION: 87 % | SYSTOLIC BLOOD PRESSURE: 124 MMHG

## 2019-07-23 PROBLEM — Z90.3 S/P PARTIAL GASTRECTOMY: Status: ACTIVE | Noted: 2019-07-23

## 2019-07-23 PROCEDURE — 6360000002 HC RX W HCPCS

## 2019-07-23 PROCEDURE — 1200000000 HC SEMI PRIVATE

## 2019-07-23 PROCEDURE — 0DJ08ZZ INSPECTION OF UPPER INTESTINAL TRACT, VIA NATURAL OR ARTIFICIAL OPENING ENDOSCOPIC: ICD-10-PCS | Performed by: SURGERY

## 2019-07-23 PROCEDURE — 7100000000 HC PACU RECOVERY - FIRST 15 MIN: Performed by: SURGERY

## 2019-07-23 PROCEDURE — 6360000002 HC RX W HCPCS: Performed by: SURGERY

## 2019-07-23 PROCEDURE — 43644 LAP GASTRIC BYPASS/ROUX-EN-Y: CPT | Performed by: SURGERY

## 2019-07-23 PROCEDURE — 3700000001 HC ADD 15 MINUTES (ANESTHESIA): Performed by: SURGERY

## 2019-07-23 PROCEDURE — C9113 INJ PANTOPRAZOLE SODIUM, VIA: HCPCS | Performed by: SURGERY

## 2019-07-23 PROCEDURE — S2900 ROBOTIC SURGICAL SYSTEM: HCPCS | Performed by: SURGERY

## 2019-07-23 PROCEDURE — 2580000003 HC RX 258

## 2019-07-23 PROCEDURE — 2500000003 HC RX 250 WO HCPCS

## 2019-07-23 PROCEDURE — 2500000003 HC RX 250 WO HCPCS: Performed by: SURGERY

## 2019-07-23 PROCEDURE — 6360000002 HC RX W HCPCS: Performed by: ANESTHESIOLOGY

## 2019-07-23 PROCEDURE — 2580000003 HC RX 258: Performed by: SURGERY

## 2019-07-23 PROCEDURE — 0D164ZA BYPASS STOMACH TO JEJUNUM, PERCUTANEOUS ENDOSCOPIC APPROACH: ICD-10-PCS | Performed by: SURGERY

## 2019-07-23 PROCEDURE — 2720000010 HC SURG SUPPLY STERILE: Performed by: SURGERY

## 2019-07-23 PROCEDURE — 88307 TISSUE EXAM BY PATHOLOGIST: CPT

## 2019-07-23 PROCEDURE — 7100000001 HC PACU RECOVERY - ADDTL 15 MIN: Performed by: SURGERY

## 2019-07-23 PROCEDURE — 3600000019 HC SURGERY ROBOT ADDTL 15MIN: Performed by: SURGERY

## 2019-07-23 PROCEDURE — 8E0W4CZ ROBOTIC ASSISTED PROCEDURE OF TRUNK REGION, PERCUTANEOUS ENDOSCOPIC APPROACH: ICD-10-PCS | Performed by: SURGERY

## 2019-07-23 PROCEDURE — 2709999900 HC NON-CHARGEABLE SUPPLY: Performed by: SURGERY

## 2019-07-23 PROCEDURE — 49905 OMENTAL FLAP INTRA-ABDOM: CPT | Performed by: SURGERY

## 2019-07-23 PROCEDURE — 3600000009 HC SURGERY ROBOT BASE: Performed by: SURGERY

## 2019-07-23 PROCEDURE — 6370000000 HC RX 637 (ALT 250 FOR IP): Performed by: SURGERY

## 2019-07-23 PROCEDURE — 0DUL47Z SUPPLEMENT TRANSVERSE COLON WITH AUTOLOGOUS TISSUE SUBSTITUTE, PERCUTANEOUS ENDOSCOPIC APPROACH: ICD-10-PCS | Performed by: SURGERY

## 2019-07-23 PROCEDURE — 3700000000 HC ANESTHESIA ATTENDED CARE: Performed by: SURGERY

## 2019-07-23 RX ORDER — MORPHINE SULFATE 2 MG/ML
2 INJECTION, SOLUTION INTRAMUSCULAR; INTRAVENOUS EVERY 5 MIN PRN
Status: DISCONTINUED | OUTPATIENT
Start: 2019-07-23 | End: 2019-07-23 | Stop reason: HOSPADM

## 2019-07-23 RX ORDER — PROMETHAZINE HYDROCHLORIDE 25 MG/ML
6.25 INJECTION, SOLUTION INTRAMUSCULAR; INTRAVENOUS
Status: DISCONTINUED | OUTPATIENT
Start: 2019-07-23 | End: 2019-07-23 | Stop reason: HOSPADM

## 2019-07-23 RX ORDER — MEPERIDINE HYDROCHLORIDE 25 MG/ML
INJECTION INTRAMUSCULAR; INTRAVENOUS; SUBCUTANEOUS
Status: DISPENSED
Start: 2019-07-23 | End: 2019-07-24

## 2019-07-23 RX ORDER — ONDANSETRON 2 MG/ML
4 INJECTION INTRAMUSCULAR; INTRAVENOUS ONCE
Status: COMPLETED | OUTPATIENT
Start: 2019-07-23 | End: 2019-07-23

## 2019-07-23 RX ORDER — PROCHLORPERAZINE EDISYLATE 5 MG/ML
10 INJECTION INTRAMUSCULAR; INTRAVENOUS EVERY 6 HOURS PRN
Status: DISCONTINUED | OUTPATIENT
Start: 2019-07-23 | End: 2019-07-24 | Stop reason: HOSPADM

## 2019-07-23 RX ORDER — ONDANSETRON 2 MG/ML
4 INJECTION INTRAMUSCULAR; INTRAVENOUS EVERY 6 HOURS PRN
Status: DISCONTINUED | OUTPATIENT
Start: 2019-07-23 | End: 2019-07-24 | Stop reason: HOSPADM

## 2019-07-23 RX ORDER — HYDROMORPHONE HYDROCHLORIDE 1 MG/ML
0.5 INJECTION, SOLUTION INTRAMUSCULAR; INTRAVENOUS; SUBCUTANEOUS EVERY 5 MIN PRN
Status: DISCONTINUED | OUTPATIENT
Start: 2019-07-23 | End: 2019-07-23 | Stop reason: HOSPADM

## 2019-07-23 RX ORDER — NEOSTIGMINE METHYLSULFATE 1 MG/ML
INJECTION, SOLUTION INTRAVENOUS PRN
Status: DISCONTINUED | OUTPATIENT
Start: 2019-07-23 | End: 2019-07-23 | Stop reason: SDUPTHER

## 2019-07-23 RX ORDER — PROMETHAZINE HYDROCHLORIDE 25 MG/ML
6.25 INJECTION, SOLUTION INTRAMUSCULAR; INTRAVENOUS EVERY 6 HOURS PRN
Status: DISCONTINUED | OUTPATIENT
Start: 2019-07-23 | End: 2019-07-24 | Stop reason: HOSPADM

## 2019-07-23 RX ORDER — MIDAZOLAM HYDROCHLORIDE 1 MG/ML
INJECTION INTRAMUSCULAR; INTRAVENOUS PRN
Status: DISCONTINUED | OUTPATIENT
Start: 2019-07-23 | End: 2019-07-23 | Stop reason: SDUPTHER

## 2019-07-23 RX ORDER — GLYCOPYRROLATE 1 MG/5 ML
SYRINGE (ML) INTRAVENOUS PRN
Status: DISCONTINUED | OUTPATIENT
Start: 2019-07-23 | End: 2019-07-23 | Stop reason: SDUPTHER

## 2019-07-23 RX ORDER — KETOROLAC TROMETHAMINE 30 MG/ML
30 INJECTION, SOLUTION INTRAMUSCULAR; INTRAVENOUS EVERY 6 HOURS
Status: DISCONTINUED | OUTPATIENT
Start: 2019-07-23 | End: 2019-07-24 | Stop reason: HOSPADM

## 2019-07-23 RX ORDER — ONDANSETRON 2 MG/ML
INJECTION INTRAMUSCULAR; INTRAVENOUS
Status: COMPLETED
Start: 2019-07-23 | End: 2019-07-23

## 2019-07-23 RX ORDER — BUPIVACAINE HYDROCHLORIDE AND EPINEPHRINE 2.5; 5 MG/ML; UG/ML
INJECTION, SOLUTION EPIDURAL; INFILTRATION; INTRACAUDAL; PERINEURAL PRN
Status: DISCONTINUED | OUTPATIENT
Start: 2019-07-23 | End: 2019-07-23 | Stop reason: ALTCHOICE

## 2019-07-23 RX ORDER — MORPHINE SULFATE 2 MG/ML
2 INJECTION, SOLUTION INTRAMUSCULAR; INTRAVENOUS
Status: DISCONTINUED | OUTPATIENT
Start: 2019-07-23 | End: 2019-07-24 | Stop reason: HOSPADM

## 2019-07-23 RX ORDER — SODIUM CHLORIDE, SODIUM LACTATE, POTASSIUM CHLORIDE, CALCIUM CHLORIDE 600; 310; 30; 20 MG/100ML; MG/100ML; MG/100ML; MG/100ML
INJECTION, SOLUTION INTRAVENOUS CONTINUOUS
Status: DISCONTINUED | OUTPATIENT
Start: 2019-07-23 | End: 2019-07-23 | Stop reason: CLARIF

## 2019-07-23 RX ORDER — SCOLOPAMINE TRANSDERMAL SYSTEM 1 MG/1
1 PATCH, EXTENDED RELEASE TRANSDERMAL
Status: DISCONTINUED | OUTPATIENT
Start: 2019-07-23 | End: 2019-07-24 | Stop reason: HOSPADM

## 2019-07-23 RX ORDER — SODIUM CHLORIDE 0.9 % (FLUSH) 0.9 %
10 SYRINGE (ML) INJECTION PRN
Status: DISCONTINUED | OUTPATIENT
Start: 2019-07-23 | End: 2019-07-24 | Stop reason: HOSPADM

## 2019-07-23 RX ORDER — PANTOPRAZOLE SODIUM 40 MG/10ML
40 INJECTION, POWDER, LYOPHILIZED, FOR SOLUTION INTRAVENOUS DAILY
Status: DISCONTINUED | OUTPATIENT
Start: 2019-07-23 | End: 2019-07-24 | Stop reason: HOSPADM

## 2019-07-23 RX ORDER — LIDOCAINE HYDROCHLORIDE 20 MG/ML
INJECTION, SOLUTION INTRAVENOUS PRN
Status: DISCONTINUED | OUTPATIENT
Start: 2019-07-23 | End: 2019-07-23 | Stop reason: SDUPTHER

## 2019-07-23 RX ORDER — 0.9 % SODIUM CHLORIDE 0.9 %
10 VIAL (ML) INJECTION DAILY
Status: DISCONTINUED | OUTPATIENT
Start: 2019-07-23 | End: 2019-07-24 | Stop reason: HOSPADM

## 2019-07-23 RX ORDER — ROCURONIUM BROMIDE 10 MG/ML
INJECTION, SOLUTION INTRAVENOUS PRN
Status: DISCONTINUED | OUTPATIENT
Start: 2019-07-23 | End: 2019-07-23 | Stop reason: SDUPTHER

## 2019-07-23 RX ORDER — MORPHINE SULFATE 2 MG/ML
INJECTION, SOLUTION INTRAMUSCULAR; INTRAVENOUS
Status: DISPENSED
Start: 2019-07-23 | End: 2019-07-24

## 2019-07-23 RX ORDER — SODIUM CHLORIDE 0.9 % (FLUSH) 0.9 %
10 SYRINGE (ML) INJECTION EVERY 12 HOURS SCHEDULED
Status: DISCONTINUED | OUTPATIENT
Start: 2019-07-23 | End: 2019-07-24 | Stop reason: HOSPADM

## 2019-07-23 RX ORDER — DEXAMETHASONE SODIUM PHOSPHATE 10 MG/ML
INJECTION, SOLUTION INTRAMUSCULAR; INTRAVENOUS PRN
Status: DISCONTINUED | OUTPATIENT
Start: 2019-07-23 | End: 2019-07-23 | Stop reason: SDUPTHER

## 2019-07-23 RX ORDER — OXYCODONE HCL 20 MG/ML
5 CONCENTRATE, ORAL ORAL EVERY 4 HOURS PRN
Status: DISCONTINUED | OUTPATIENT
Start: 2019-07-23 | End: 2019-07-24 | Stop reason: HOSPADM

## 2019-07-23 RX ORDER — MEPERIDINE HYDROCHLORIDE 25 MG/ML
12.5 INJECTION INTRAMUSCULAR; INTRAVENOUS; SUBCUTANEOUS EVERY 5 MIN PRN
Status: COMPLETED | OUTPATIENT
Start: 2019-07-23 | End: 2019-07-23

## 2019-07-23 RX ORDER — SODIUM CHLORIDE 9 MG/ML
INJECTION, SOLUTION INTRAVENOUS CONTINUOUS PRN
Status: DISCONTINUED | OUTPATIENT
Start: 2019-07-23 | End: 2019-07-23 | Stop reason: SDUPTHER

## 2019-07-23 RX ORDER — SODIUM CHLORIDE 9 MG/ML
INJECTION, SOLUTION INTRAVENOUS CONTINUOUS
Status: DISCONTINUED | OUTPATIENT
Start: 2019-07-23 | End: 2019-07-23

## 2019-07-23 RX ORDER — KETOROLAC TROMETHAMINE 30 MG/ML
INJECTION, SOLUTION INTRAMUSCULAR; INTRAVENOUS PRN
Status: DISCONTINUED | OUTPATIENT
Start: 2019-07-23 | End: 2019-07-23 | Stop reason: SDUPTHER

## 2019-07-23 RX ORDER — SCOLOPAMINE TRANSDERMAL SYSTEM 1 MG/1
PATCH, EXTENDED RELEASE TRANSDERMAL
Status: DISPENSED
Start: 2019-07-23 | End: 2019-07-24

## 2019-07-23 RX ORDER — ONDANSETRON 2 MG/ML
INJECTION INTRAMUSCULAR; INTRAVENOUS PRN
Status: DISCONTINUED | OUTPATIENT
Start: 2019-07-23 | End: 2019-07-23 | Stop reason: SDUPTHER

## 2019-07-23 RX ORDER — FENTANYL CITRATE 50 UG/ML
INJECTION, SOLUTION INTRAMUSCULAR; INTRAVENOUS PRN
Status: DISCONTINUED | OUTPATIENT
Start: 2019-07-23 | End: 2019-07-23 | Stop reason: SDUPTHER

## 2019-07-23 RX ORDER — ONDANSETRON 2 MG/ML
4 INJECTION INTRAMUSCULAR; INTRAVENOUS
Status: COMPLETED | OUTPATIENT
Start: 2019-07-23 | End: 2019-07-23

## 2019-07-23 RX ORDER — PROPOFOL 10 MG/ML
INJECTION, EMULSION INTRAVENOUS PRN
Status: DISCONTINUED | OUTPATIENT
Start: 2019-07-23 | End: 2019-07-23 | Stop reason: SDUPTHER

## 2019-07-23 RX ORDER — SODIUM CHLORIDE 0.9 % (FLUSH) 0.9 %
10 SYRINGE (ML) INJECTION PRN
Status: DISCONTINUED | OUTPATIENT
Start: 2019-07-23 | End: 2019-07-23 | Stop reason: HOSPADM

## 2019-07-23 RX ORDER — SODIUM CHLORIDE 0.9 % (FLUSH) 0.9 %
10 SYRINGE (ML) INJECTION EVERY 12 HOURS SCHEDULED
Status: DISCONTINUED | OUTPATIENT
Start: 2019-07-23 | End: 2019-07-23 | Stop reason: HOSPADM

## 2019-07-23 RX ADMIN — MORPHINE SULFATE 2 MG: 2 INJECTION, SOLUTION INTRAMUSCULAR; INTRAVENOUS at 14:05

## 2019-07-23 RX ADMIN — FENTANYL CITRATE 100 MCG: 50 INJECTION, SOLUTION INTRAMUSCULAR; INTRAVENOUS at 12:15

## 2019-07-23 RX ADMIN — FENTANYL CITRATE 50 MCG: 50 INJECTION, SOLUTION INTRAMUSCULAR; INTRAVENOUS at 13:29

## 2019-07-23 RX ADMIN — MEPERIDINE HYDROCHLORIDE 12.5 MG: 25 INJECTION INTRAMUSCULAR; INTRAVENOUS; SUBCUTANEOUS at 13:50

## 2019-07-23 RX ADMIN — PANTOPRAZOLE SODIUM 40 MG: 40 INJECTION, POWDER, LYOPHILIZED, FOR SOLUTION INTRAVENOUS at 18:44

## 2019-07-23 RX ADMIN — MEPERIDINE HYDROCHLORIDE 12.5 MG: 25 INJECTION INTRAMUSCULAR; INTRAVENOUS; SUBCUTANEOUS at 13:45

## 2019-07-23 RX ADMIN — Medication 0.6 MG: at 13:09

## 2019-07-23 RX ADMIN — ONDANSETRON 4 MG: 2 INJECTION INTRAMUSCULAR; INTRAVENOUS at 14:00

## 2019-07-23 RX ADMIN — LIDOCAINE HYDROCHLORIDE 40 MG: 20 INJECTION, SOLUTION INTRAVENOUS at 11:53

## 2019-07-23 RX ADMIN — HYDROMORPHONE HYDROCHLORIDE 0.5 MG: 1 INJECTION, SOLUTION INTRAMUSCULAR; INTRAVENOUS; SUBCUTANEOUS at 14:30

## 2019-07-23 RX ADMIN — CEFAZOLIN 3 G: 10 INJECTION, POWDER, FOR SOLUTION INTRAVENOUS; PARENTERAL at 22:19

## 2019-07-23 RX ADMIN — Medication 10 MG: at 12:13

## 2019-07-23 RX ADMIN — DEXAMETHASONE SODIUM PHOSPHATE 10 MG: 10 INJECTION, SOLUTION INTRAMUSCULAR; INTRAVENOUS at 12:07

## 2019-07-23 RX ADMIN — SODIUM CHLORIDE: 9 INJECTION, SOLUTION INTRAVENOUS at 11:48

## 2019-07-23 RX ADMIN — KETOROLAC TROMETHAMINE 30 MG: 30 INJECTION, SOLUTION INTRAMUSCULAR; INTRAVENOUS at 18:43

## 2019-07-23 RX ADMIN — SODIUM CHLORIDE: 9 INJECTION, SOLUTION INTRAVENOUS at 09:17

## 2019-07-23 RX ADMIN — ONDANSETRON 4 MG: 2 INJECTION INTRAMUSCULAR; INTRAVENOUS at 09:18

## 2019-07-23 RX ADMIN — CEFAZOLIN 3 G: 10 INJECTION, POWDER, FOR SOLUTION INTRAVENOUS; PARENTERAL at 11:58

## 2019-07-23 RX ADMIN — MEPERIDINE HYDROCHLORIDE 12.5 MG: 25 INJECTION INTRAMUSCULAR; INTRAVENOUS; SUBCUTANEOUS at 14:15

## 2019-07-23 RX ADMIN — MORPHINE SULFATE 2 MG: 2 INJECTION, SOLUTION INTRAMUSCULAR; INTRAVENOUS at 14:10

## 2019-07-23 RX ADMIN — FENTANYL CITRATE 50 MCG: 50 INJECTION, SOLUTION INTRAMUSCULAR; INTRAVENOUS at 12:43

## 2019-07-23 RX ADMIN — Medication 10 ML: at 22:20

## 2019-07-23 RX ADMIN — FENTANYL CITRATE 100 MCG: 50 INJECTION, SOLUTION INTRAMUSCULAR; INTRAVENOUS at 11:53

## 2019-07-23 RX ADMIN — Medication 20 MG: at 12:43

## 2019-07-23 RX ADMIN — Medication 3 MG: at 13:09

## 2019-07-23 RX ADMIN — MEPERIDINE HYDROCHLORIDE 12.5 MG: 25 INJECTION INTRAMUSCULAR; INTRAVENOUS; SUBCUTANEOUS at 14:28

## 2019-07-23 RX ADMIN — MIDAZOLAM 2 MG: 1 INJECTION INTRAMUSCULAR; INTRAVENOUS at 11:48

## 2019-07-23 RX ADMIN — PROPOFOL 50 MG: 10 INJECTION, EMULSION INTRAVENOUS at 12:43

## 2019-07-23 RX ADMIN — KETOROLAC TROMETHAMINE 30 MG: 30 INJECTION, SOLUTION INTRAMUSCULAR; INTRAVENOUS at 13:09

## 2019-07-23 RX ADMIN — PROPOFOL 200 MG: 10 INJECTION, EMULSION INTRAVENOUS at 11:53

## 2019-07-23 RX ADMIN — ONDANSETRON 4 MG: 2 INJECTION INTRAMUSCULAR; INTRAVENOUS at 18:47

## 2019-07-23 RX ADMIN — ONDANSETRON HYDROCHLORIDE 4 MG: 2 INJECTION, SOLUTION INTRAMUSCULAR; INTRAVENOUS at 13:07

## 2019-07-23 RX ADMIN — Medication 10 ML: at 18:52

## 2019-07-23 RX ADMIN — POTASSIUM CHLORIDE 125 ML/HR: 2 INJECTION, SOLUTION, CONCENTRATE INTRAVENOUS at 18:52

## 2019-07-23 RX ADMIN — Medication 50 MG: at 11:53

## 2019-07-23 ASSESSMENT — PAIN DESCRIPTION - PROGRESSION
CLINICAL_PROGRESSION: RAPIDLY WORSENING
CLINICAL_PROGRESSION: GRADUALLY WORSENING
CLINICAL_PROGRESSION: NOT CHANGED
CLINICAL_PROGRESSION: GRADUALLY IMPROVING
CLINICAL_PROGRESSION: NOT CHANGED
CLINICAL_PROGRESSION: GRADUALLY IMPROVING
CLINICAL_PROGRESSION: NOT CHANGED
CLINICAL_PROGRESSION: NOT CHANGED
CLINICAL_PROGRESSION: GRADUALLY WORSENING

## 2019-07-23 ASSESSMENT — PULMONARY FUNCTION TESTS
PIF_VALUE: 25
PIF_VALUE: 28
PIF_VALUE: 25
PIF_VALUE: 26
PIF_VALUE: 28
PIF_VALUE: 33
PIF_VALUE: 0
PIF_VALUE: 30
PIF_VALUE: 27
PIF_VALUE: 29
PIF_VALUE: 32
PIF_VALUE: 31
PIF_VALUE: 27
PIF_VALUE: 25
PIF_VALUE: 25
PIF_VALUE: 27
PIF_VALUE: 27
PIF_VALUE: 26
PIF_VALUE: 29
PIF_VALUE: 2
PIF_VALUE: 28
PIF_VALUE: 1
PIF_VALUE: 25
PIF_VALUE: 27
PIF_VALUE: 24
PIF_VALUE: 4
PIF_VALUE: 28
PIF_VALUE: 28
PIF_VALUE: 10
PIF_VALUE: 23
PIF_VALUE: 26
PIF_VALUE: 27
PIF_VALUE: 28
PIF_VALUE: 25
PIF_VALUE: 29
PIF_VALUE: 27
PIF_VALUE: 28
PIF_VALUE: 29
PIF_VALUE: 28
PIF_VALUE: 29
PIF_VALUE: 27
PIF_VALUE: 28
PIF_VALUE: 27
PIF_VALUE: 29
PIF_VALUE: 30
PIF_VALUE: 24
PIF_VALUE: 23
PIF_VALUE: 27
PIF_VALUE: 26
PIF_VALUE: 2
PIF_VALUE: 28
PIF_VALUE: 29
PIF_VALUE: 1
PIF_VALUE: 25
PIF_VALUE: 4
PIF_VALUE: 28
PIF_VALUE: 35
PIF_VALUE: 29
PIF_VALUE: 27
PIF_VALUE: 29
PIF_VALUE: 28
PIF_VALUE: 29
PIF_VALUE: 28
PIF_VALUE: 28
PIF_VALUE: 25
PIF_VALUE: 29
PIF_VALUE: 27
PIF_VALUE: 17
PIF_VALUE: 27
PIF_VALUE: 0
PIF_VALUE: 1
PIF_VALUE: 1
PIF_VALUE: 27
PIF_VALUE: 26
PIF_VALUE: 28
PIF_VALUE: 29
PIF_VALUE: 25
PIF_VALUE: 2
PIF_VALUE: 3
PIF_VALUE: 29
PIF_VALUE: 28
PIF_VALUE: 29
PIF_VALUE: 29
PIF_VALUE: 27
PIF_VALUE: 28
PIF_VALUE: 27

## 2019-07-23 ASSESSMENT — PAIN - FUNCTIONAL ASSESSMENT: PAIN_FUNCTIONAL_ASSESSMENT: 0-10

## 2019-07-23 ASSESSMENT — PAIN DESCRIPTION - FREQUENCY
FREQUENCY: CONTINUOUS
FREQUENCY: INTERMITTENT
FREQUENCY: CONTINUOUS
FREQUENCY: INTERMITTENT
FREQUENCY: INTERMITTENT
FREQUENCY: CONTINUOUS

## 2019-07-23 ASSESSMENT — PAIN DESCRIPTION - PAIN TYPE
TYPE: SURGICAL PAIN

## 2019-07-23 ASSESSMENT — PAIN DESCRIPTION - LOCATION
LOCATION: ABDOMEN

## 2019-07-23 ASSESSMENT — PAIN SCALES - GENERAL
PAINLEVEL_OUTOF10: 3
PAINLEVEL_OUTOF10: 6
PAINLEVEL_OUTOF10: 6
PAINLEVEL_OUTOF10: 5
PAINLEVEL_OUTOF10: 6
PAINLEVEL_OUTOF10: 5
PAINLEVEL_OUTOF10: 7
PAINLEVEL_OUTOF10: 3
PAINLEVEL_OUTOF10: 6
PAINLEVEL_OUTOF10: 6
PAINLEVEL_OUTOF10: 5
PAINLEVEL_OUTOF10: 6
PAINLEVEL_OUTOF10: 6

## 2019-07-23 ASSESSMENT — PAIN DESCRIPTION - ONSET
ONSET: ON-GOING
ONSET: GRADUAL
ONSET: ON-GOING
ONSET: ON-GOING
ONSET: GRADUAL

## 2019-07-23 ASSESSMENT — PAIN DESCRIPTION - DESCRIPTORS
DESCRIPTORS: ACHING;CONSTANT;SORE;TENDER
DESCRIPTORS: ACHING;CRAMPING;TENDER;SORE
DESCRIPTORS: DISCOMFORT;CRAMPING
DESCRIPTORS: ACHING;CONSTANT;TENDER
DESCRIPTORS: DISCOMFORT;DULL;SORE
DESCRIPTORS: ACHING;CONSTANT;CRAMPING;SORE;TENDER
DESCRIPTORS: TENDER;SORE

## 2019-07-23 ASSESSMENT — LIFESTYLE VARIABLES: SMOKING_STATUS: 0

## 2019-07-23 NOTE — OP NOTE
mesenteric defect was closed with a running absorbable barbed suture to prevent internal hernias. The abdomen was filled with saline. A bowel clamp was placed on the jejunum distal to the gastrojejunal anastomosis. An Endoscope was passed down through the mouth. The scope fit past the hiatal hernia repair without problems. The scope was pushed into the pouch. Old blood was removed with suction. The pouch was inflated with air. There was no bubbling from any of the staple lines indicating they were airtight and watertight. The anastomosis was open approximately 9 mm. The scope was easily passed through the anastomosis into the jejunum. All of the mucosa looked healthy. The scope was withdrawn. The omentum was mobilized from the greater curvature of the stomach and the transverse colon using harmonic scalpel. The anastomosis was wrapped with omentum and tacked with a v lock suture. This covered the entire gastrojejunal anastomosis. All of the fluid in the abdomen was removed with suction. All of the CO2 was released. The trocars were removed. Skin wounds were closed with 4-0 Monocryl dermal stitches and Derma+flex glue was applied. The needle and sponge count were correct. The patient tolerated the procedure well and went to recovery in stable condition. Dr Dominguez Nelson was critical in hiatal dissection and performing the anastomosis and upper endoscopy. His assistance was critical in completing the procedure.     Guanako Fox MD

## 2019-07-23 NOTE — ANESTHESIA PRE PROCEDURE
Department of Anesthesiology  Preprocedure Note       Name:  Apple Guerrero   Age:  34 y.o.  :  1985                                          MRN:  [id number]         Date:  2019      Surgeon: Michael Krause):  Gisela Solomon MD    Procedure: GASTRIC BYPASS CHAZ-EN-Y LAPAROSCOPIC ROBOTIC XI (N/A Abdomen)    Medications prior to admission:   Prior to Admission medications    Medication Sig Start Date End Date Taking?  Authorizing Provider   omeprazole (PRILOSEC) 20 MG delayed release capsule Take 1 capsule by mouth Daily 19  Gisela Solomon MD   ondansetron (ZOFRAN ODT) 4 MG disintegrating tablet Take 1 tablet by mouth every 8 hours as needed for Nausea or Vomiting 19   Gisela Solomon MD   Multiple Vitamin (MULTI-VITAMIN DAILY PO) Take 1 tablet by mouth daily    Historical Provider, MD   Cholecalciferol (VITAMIN D3) 5000 units CAPS Take one capsule every day 19   Munira Shirley MD       Current medications:    Current Facility-Administered Medications   Medication Dose Route Frequency Provider Last Rate Last Dose    0.9 % sodium chloride infusion   Intravenous Continuous Gisela Solomon  mL/hr at 19 4102      sodium chloride flush 0.9 % injection 10 mL  10 mL Intravenous 2 times per day Gisela Solomon MD        sodium chloride flush 0.9 % injection 10 mL  10 mL Intravenous PRN Gisela Solomon MD        ceFAZolin (ANCEF) 3 g in dextrose 5 % 100 mL IVPB  3 g Intravenous On Call to Jayy Bailey MD           Allergies:  No Known Allergies    Problem List:    Patient Active Problem List   Diagnosis Code    Morbid obesity with BMI of 50.0-59.9, adult ([facility] [address] E66.01, Z68.43    Fatty liver K76.0    Hiatal hernia K44.9       Past Medical History:        Diagnosis Date    Morbid obesity due to excess calories [facility])        Past Surgical History:        Procedure Laterality Date    ADENOIDECTOMY      ENDOSCOPY, COLON, DIAGNOSTIC  2018    with biopsy    Applicable): No results found for: PREGTESTUR, PREGSERUM, HCG, HCGQUANT     ABGs: No results found for: PHART, PO2ART, SEL7IMJ, VPV2IOC, BEART, D8MPCLLD     Type & Screen (If Applicable):  [facility] found [facility]: [facility]    Anesthesia Evaluation  Patient summary reviewed no history of anesthetic complications:   Airway: Mallampati: II  TM distance: >3 FB   Neck ROM: full  Mouth opening: > = 3 FB Dental: normal exam         Pulmonary: breath sounds clear to auscultation      (-) not a current smoker                           Cardiovascular:Negative CV ROS            Rhythm: regular  Rate: normal                    Neuro/Psych:               GI/Hepatic/Renal:   (+) hiatal hernia, liver disease:, morbid obesity          Endo/Other:                     Abdominal:   (+) obese,         Vascular: negative vascular ROS. Anesthesia Plan      general     ASA 3       Induction: intravenous. MIPS: Postoperative opioids intended and Prophylactic antiemetics administered. Anesthetic plan and risks discussed with patient, spouse and mother. Plan discussed with CRNA. DOS STAFF ADDENDUM:    Pt seen and examined, chart reviewed (including anesthesia, drug and allergy history). Anesthetic plan, risks, benefits, alternatives, and personnel involved discussed with patient. Patient verbalized an understanding and agrees to proceed. Plan discussed with care team members and agreed upon.     Gem Kennedy MD  Staff Anesthesiologist  10:02 AM        Gem Kennedy MD   7/23/2019

## 2019-07-23 NOTE — H&P
Roel Higgins  7/23/2019  [id number]  [address] [facility]  [facility]               History and Physical  Gastric Bypass     CHIEF COMPLAINT: Morbid obesity, GERD    HISTORY OF PRESENT ILLNESS: Roel Higgins is a morbidly-obese 34 y.o.  male, who weighs (!) 375 lb (170.1 kg). He is 205 pounds over his ideal body weight. The Body mass index is 49.27 kg/m². He has lost 43 pounds over the past several months in preparation for surgery. He has multiple medical problems aggravated by his obesity. He wishes to have a gastric bypass so that he can lose more weight and keep the weight off. I have met with him on 2 different occasions in the [facility], where we discussed the surgery in great detail and went over the risks and benefits. He has watched our informational video so he understands all of the extensive risks involved. He states that he understands all of these risks and wishes to proceed. No Known Allergies    No current facility-administered medications on file prior to encounter.       Current Outpatient Medications on File Prior to Encounter   Medication Sig Dispense Refill    Cholecalciferol (VITAMIN D3) 5000 units CAPS Take one capsule every day 180 capsule 1       Past Medical History:   Diagnosis Date    Morbid obesity due to excess calories [facility])        Past Surgical History:   Procedure Laterality Date    ADENOIDECTOMY      ENDOSCOPY, COLON, DIAGNOSTIC  03/05/2018    with biopsy    TONSILLECTOMY  1999    VASECTOMY  2016    WISDOM TOOTH EXTRACTION  2011       Current Facility-Administered Medications   Medication Dose Route Frequency Provider Last Rate Last Dose    0.9 % sodium chloride infusion   Intravenous Continuous Gianna Baird  mL/hr at 07/23/19 0917      sodium chloride flush 0.9 % injection 10 mL  10 mL Intravenous 2 times per day Gianna Baird MD        sodium chloride flush 0.9 % injection 10 mL  10 mL Intravenous PRN limited to: death, anastomotic leak, obstruction, bleeding, and sepsis. Any of these could require further surgery. Other risks include heart attack, DVT, PE, pneumonia, hernia, wound infection, the need for dilatations of the gastrojejunostomy, and the inability to lose appropriate weight and keep it off. We may not be able to do a Gastic Bypass, in that case we will do a Sleeve Gastrectomy. We discussed that our goal is to ameliorate the medical problems and not to obtain a specific body mass index. He understands the risks and benefits and wishes to proceed with the procedure. He has signed a consent form. Plan:  ([id number]) Laparoscopic Keyshawn-en-Y Gastric Bypass possible hiatal hernia repair. He does not need Lovenox post-op. Physician Signature: Electronically signed by Dr. Romel Reyes name on file.     Send copy of H&P to PCP, Elizabeth Keyes MD

## 2019-07-24 VITALS
RESPIRATION RATE: 18 BRPM | TEMPERATURE: 98 F | OXYGEN SATURATION: 97 % | DIASTOLIC BLOOD PRESSURE: 76 MMHG | BODY MASS INDEX: 42.66 KG/M2 | HEIGHT: 72 IN | SYSTOLIC BLOOD PRESSURE: 136 MMHG | WEIGHT: 315 LBS | HEART RATE: 62 BPM

## 2019-07-24 LAB
ALBUMIN SERPL-MCNC: 3.5 G/DL (ref 3.5–5.2)
ALP BLD-CCNC: 85 U/L (ref 40–129)
ALT SERPL-CCNC: 51 U/L (ref 0–40)
ANION GAP SERPL CALCULATED.3IONS-SCNC: 11 MMOL/L (ref 7–16)
AST SERPL-CCNC: 29 U/L (ref 0–39)
BASOPHILS ABSOLUTE: 0.01 E9/L (ref 0–0.2)
BASOPHILS RELATIVE PERCENT: 0.1 % (ref 0–2)
BILIRUB SERPL-MCNC: 0.4 MG/DL (ref 0–1.2)
BILIRUBIN DIRECT: <0.2 MG/DL (ref 0–0.3)
BILIRUBIN, INDIRECT: ABNORMAL MG/DL (ref 0–1)
BUN BLDV-MCNC: 10 MG/DL (ref 6–20)
CALCIUM SERPL-MCNC: 8.4 MG/DL (ref 8.6–10.2)
CHLORIDE BLD-SCNC: 103 MMOL/L (ref 98–107)
CO2: 23 MMOL/L (ref 22–29)
CREAT SERPL-MCNC: 0.9 MG/DL (ref 0.7–1.2)
EOSINOPHILS ABSOLUTE: 0 E9/L (ref 0.05–0.5)
EOSINOPHILS RELATIVE PERCENT: 0 % (ref 0–6)
GFR AFRICAN AMERICAN: >60
GFR NON-AFRICAN AMERICAN: >60 ML/MIN/1.73
GLUCOSE BLD-MCNC: 115 MG/DL (ref 74–99)
HCT VFR BLD CALC: 41.9 % (ref 37–54)
HEMOGLOBIN: 13.5 G/DL (ref 12.5–16.5)
IMMATURE GRANULOCYTES #: 0.04 E9/L
IMMATURE GRANULOCYTES %: 0.3 % (ref 0–5)
LYMPHOCYTES ABSOLUTE: 1.05 E9/L (ref 1.5–4)
LYMPHOCYTES RELATIVE PERCENT: 7.9 % (ref 20–42)
MCH RBC QN AUTO: 28.1 PG (ref 26–35)
MCHC RBC AUTO-ENTMCNC: 32.2 % (ref 32–34.5)
MCV RBC AUTO: 87.3 FL (ref 80–99.9)
MONOCYTES ABSOLUTE: 0.81 E9/L (ref 0.1–0.95)
MONOCYTES RELATIVE PERCENT: 6.1 % (ref 2–12)
NEUTROPHILS ABSOLUTE: 11.36 E9/L (ref 1.8–7.3)
NEUTROPHILS RELATIVE PERCENT: 85.6 % (ref 43–80)
PDW BLD-RTO: 14 FL (ref 11.5–15)
PLATELET # BLD: 271 E9/L (ref 130–450)
PMV BLD AUTO: 10.9 FL (ref 7–12)
POTASSIUM REFLEX MAGNESIUM: 4.4 MMOL/L (ref 3.5–5)
RBC # BLD: 4.8 E12/L (ref 3.8–5.8)
SODIUM BLD-SCNC: 137 MMOL/L (ref 132–146)
TOTAL PROTEIN: 7.1 G/DL (ref 6.4–8.3)
WBC # BLD: 13.3 E9/L (ref 4.5–11.5)

## 2019-07-24 PROCEDURE — 6360000002 HC RX W HCPCS: Performed by: SURGERY

## 2019-07-24 PROCEDURE — 85025 COMPLETE CBC W/AUTO DIFF WBC: CPT

## 2019-07-24 PROCEDURE — 36415 COLL VENOUS BLD VENIPUNCTURE: CPT

## 2019-07-24 PROCEDURE — 80048 BASIC METABOLIC PNL TOTAL CA: CPT

## 2019-07-24 PROCEDURE — 80076 HEPATIC FUNCTION PANEL: CPT

## 2019-07-24 PROCEDURE — 2580000003 HC RX 258: Performed by: SURGERY

## 2019-07-24 PROCEDURE — C9113 INJ PANTOPRAZOLE SODIUM, VIA: HCPCS | Performed by: SURGERY

## 2019-07-24 RX ADMIN — KETOROLAC TROMETHAMINE 30 MG: 30 INJECTION, SOLUTION INTRAMUSCULAR; INTRAVENOUS at 06:49

## 2019-07-24 RX ADMIN — Medication 10 ML: at 08:50

## 2019-07-24 RX ADMIN — PANTOPRAZOLE SODIUM 40 MG: 40 INJECTION, POWDER, LYOPHILIZED, FOR SOLUTION INTRAVENOUS at 08:50

## 2019-07-24 RX ADMIN — ENOXAPARIN SODIUM 40 MG: 40 INJECTION SUBCUTANEOUS at 08:50

## 2019-07-24 RX ADMIN — KETOROLAC TROMETHAMINE 30 MG: 30 INJECTION, SOLUTION INTRAMUSCULAR; INTRAVENOUS at 01:12

## 2019-07-24 RX ADMIN — CEFAZOLIN 3 G: 10 INJECTION, POWDER, FOR SOLUTION INTRAVENOUS; PARENTERAL at 04:09

## 2019-07-24 RX ADMIN — POTASSIUM CHLORIDE 125 ML/HR: 2 INJECTION, SOLUTION, CONCENTRATE INTRAVENOUS at 04:09

## 2019-07-24 ASSESSMENT — PAIN SCALES - GENERAL
PAINLEVEL_OUTOF10: 1
PAINLEVEL_OUTOF10: 0
PAINLEVEL_OUTOF10: 3
PAINLEVEL_OUTOF10: 3

## 2019-07-24 ASSESSMENT — PAIN DESCRIPTION - PAIN TYPE: TYPE: SURGICAL PAIN

## 2019-07-24 ASSESSMENT — PAIN - FUNCTIONAL ASSESSMENT: PAIN_FUNCTIONAL_ASSESSMENT: ACTIVITIES ARE NOT PREVENTED

## 2019-07-24 ASSESSMENT — PAIN DESCRIPTION - PROGRESSION: CLINICAL_PROGRESSION: GRADUALLY IMPROVING

## 2019-07-24 ASSESSMENT — PAIN DESCRIPTION - ONSET: ONSET: GRADUAL

## 2019-07-24 ASSESSMENT — PAIN DESCRIPTION - DESCRIPTORS: DESCRIPTORS: ACHING;DISCOMFORT

## 2019-07-24 ASSESSMENT — PAIN DESCRIPTION - LOCATION: LOCATION: ABDOMEN

## 2019-07-24 ASSESSMENT — PAIN DESCRIPTION - FREQUENCY: FREQUENCY: INTERMITTENT

## 2019-07-24 NOTE — CONSULTS
Name:  Bird Elias  :  1985  MRN:  [id number]  Room:  [address] [place] [address]  DOS:  2019    [address]  Internal Medicine  -Resident Consult Note-    PCP:  Martita Dial MD  Admitting Physician:  Chica Munson MD  Consultants: Medicine  Chief Complaint:   No chief complaint on file. History of Present Illness  Bird Elias is a 34 y.o. male who presents to [facility] for scheduled Keyshawn-en-Y gastric bypass. Medical history is pertinent for morbid obesity. Patient underwent successful Keyshawn-en-Y gastric bypass earlier today. He is currently resting comfortably and tolerating bariatric liquid diet. Histories  Past Medical History:   Diagnosis Date    Morbid obesity due to excess calories [facility])      Past Surgical History:   Procedure Laterality Date    ABDOMEN SURGERY      ADENOIDECTOMY      ENDOSCOPY, COLON, DIAGNOSTIC  2018    with biopsy    FRACTURE SURGERY      broken L arm x2 and L leg    TONSILLECTOMY      VASECTOMY      WISDOM TOOTH EXTRACTION       History reviewed. No pertinent family history. Home Medications  Prior to Admission medications    Medication Sig Start Date End Date Taking? Authorizing Provider   Multiple Vitamin (MULTI-VITAMIN DAILY PO) Take 1 tablet by mouth daily   Yes Historical Provider, MD   Cholecalciferol (VITAMIN D3) 5000 units CAPS Take one capsule every day 19  Yes Gregory Horan MD   omeprazole (PRILOSEC) 20 MG delayed release capsule Take 1 capsule by mouth Daily 19  Chica Munson MD   ondansetron (ZOFRAN ODT) 4 MG disintegrating tablet Take 1 tablet by mouth every 8 hours as needed for Nausea or Vomiting 19   Chica Munson MD       Allergies  Patient has no known allergies.     Social History  Social History     Socioeconomic History    Marital status:      Spouse name: Not on file    Number of children: Not on file    Years of education: Not on file    Highest education level: Not

## 2019-07-29 ENCOUNTER — HOSPITAL ENCOUNTER (OUTPATIENT)
Age: 34
Discharge: HOME | End: 2019-07-29
Payer: COMMERCIAL

## 2019-07-29 DIAGNOSIS — K91.2 MALNUTRITION FOLLOWING GASTROINTESTINAL SURGERY: ICD-10-CM

## 2019-07-29 LAB
ALBUMIN SERPL-MCNC: 3.7 G/DL (ref 3.5–5.2)
ALP BLD-CCNC: 104 U/L (ref 40–129)
ALT SERPL-CCNC: 59 U/L (ref 0–40)
ANION GAP SERPL CALCULATED.3IONS-SCNC: 13 MMOL/L (ref 7–16)
AST SERPL-CCNC: 34 U/L (ref 0–39)
BILIRUB SERPL-MCNC: 0.7 MG/DL (ref 0–1.2)
BUN BLDV-MCNC: 13 MG/DL (ref 6–20)
CALCIUM SERPL-MCNC: 9.4 MG/DL (ref 8.6–10.2)
CHLORIDE BLD-SCNC: 98 MMOL/L (ref 98–107)
CO2: 28 MMOL/L (ref 22–29)
CREAT SERPL-MCNC: 1 MG/DL (ref 0.7–1.2)
GFR AFRICAN AMERICAN: >60
GFR NON-AFRICAN AMERICAN: >60 ML/MIN/1.73
GLUCOSE BLD-MCNC: 99 MG/DL (ref 74–99)
HCT VFR BLD CALC: 47.7 % (ref 37–54)
HEMOGLOBIN: 15.3 G/DL (ref 12.5–16.5)
MCH RBC QN AUTO: 28.1 PG (ref 26–35)
MCHC RBC AUTO-ENTMCNC: 32.1 % (ref 32–34.5)
MCV RBC AUTO: 87.5 FL (ref 80–99.9)
PDW BLD-RTO: 13.6 FL (ref 11.5–15)
PLATELET # BLD: 301 E9/L (ref 130–450)
PMV BLD AUTO: 10.3 FL (ref 7–12)
POTASSIUM SERPL-SCNC: 4.9 MMOL/L (ref 3.5–5)
RBC # BLD: 5.45 E12/L (ref 3.8–5.8)
SODIUM BLD-SCNC: 139 MMOL/L (ref 132–146)
TOTAL PROTEIN: 8.3 G/DL (ref 6.4–8.3)
WBC # BLD: 9.1 E9/L (ref 4.5–11.5)

## 2019-07-29 PROCEDURE — 36415 COLL VENOUS BLD VENIPUNCTURE: CPT

## 2019-07-29 PROCEDURE — 80053 COMPREHEN METABOLIC PANEL: CPT

## 2019-07-29 PROCEDURE — 85027 COMPLETE CBC AUTOMATED: CPT

## 2019-08-06 ENCOUNTER — OFFICE VISIT (OUTPATIENT)
Dept: BARIATRICS/WEIGHT MGMT | Age: 34
End: 2019-08-06
Payer: COMMERCIAL

## 2019-08-06 ENCOUNTER — INITIAL CONSULT (OUTPATIENT)
Dept: BARIATRICS/WEIGHT MGMT | Age: 34
End: 2019-08-06
Payer: COMMERCIAL

## 2019-08-06 VITALS — HEIGHT: 72 IN | WEIGHT: 315 LBS | BODY MASS INDEX: 42.66 KG/M2

## 2019-08-06 VITALS
HEIGHT: 72 IN | BODY MASS INDEX: 42.66 KG/M2 | DIASTOLIC BLOOD PRESSURE: 82 MMHG | RESPIRATION RATE: 20 BRPM | HEART RATE: 74 BPM | TEMPERATURE: 97.4 F | WEIGHT: 315 LBS | SYSTOLIC BLOOD PRESSURE: 146 MMHG

## 2019-08-06 DIAGNOSIS — K91.2 MALNUTRITION FOLLOWING GASTROINTESTINAL SURGERY: Primary | ICD-10-CM

## 2019-08-06 DIAGNOSIS — Z71.3 DIETARY COUNSELING: Primary | ICD-10-CM

## 2019-08-06 PROCEDURE — 99212 OFFICE O/P EST SF 10 MIN: CPT

## 2019-08-06 PROCEDURE — 99024 POSTOP FOLLOW-UP VISIT: CPT | Performed by: SURGERY

## 2019-08-06 PROCEDURE — 99999 PR OFFICE/OUTPT VISIT,PROCEDURE ONLY: CPT

## 2019-08-06 NOTE — PROGRESS NOTES
Oral Hose  8/6/2019  Laparoscopic Ekyshawn-en- Y Gastric Bypass  Two weeks Post-Op Follow-up. Subjective:   Nahum Gardiner is a 34 y.o. male is two weeks post Laparoscopic Keyshawn-en-Y Gastric Bypass. The patient is not having any pain. Reports no problems with swallowing liquids, bowel movements, voiding, or the wounds. He is not having swallowing difficulty, is compliant most of the time with the multivitamins and calcium + Vit D. He is meeting fluid recommendations of at least 64 ounces per day and is meeting protein recommendations (80g). Exercise: walking 30 minutes/day- 5 days/week. (!) 347 lb (157.4 kg) Today's weight represents a weight loss of 28 pounds since surgery. Prior to Admission medications    Medication Sig Start Date End Date Taking? Authorizing Provider   IRON PO Take 29 mg by mouth daily   Yes Historical Provider, MD   Calcium Carbonate (CALCI-CHEW PO) Take 3 tablets by mouth daily Indications: Bar Adv   Yes Historical Provider, MD   omeprazole (PRILOSEC) 20 MG delayed release capsule Take 1 capsule by mouth Daily 7/5/19 7/4/20 Yes Melani Howell MD   ondansetron (ZOFRAN ODT) 4 MG disintegrating tablet Take 1 tablet by mouth every 8 hours as needed for Nausea or Vomiting 7/5/19  Yes Melani Howell MD   Multiple Vitamin (MULTI-VITAMIN DAILY PO) Take 2 tablets by mouth daily Indications:  Bar Adv    Yes Historical Provider, MD   Cholecalciferol (VITAMIN D3) 5000 units CAPS Take one capsule every day 5/24/19  Yes Bret Vyas MD        Physical exam:  VITALS: BP (!) 146/82 (Site: Left Lower Arm, Position: Sitting, Cuff Size: Medium Adult)   Pulse 74   Temp 97.4 °F (36.3 °C) (Temporal)   Resp 20   Ht 6' (1.829 m)   Wt (!) 347 lb (157.4 kg)   BMI 47.06 kg/m²    General appearance: alert, appears stated age and cooperative  Head: Normocephalic, without obvious abnormality, atraumatic  Neck: no adenopathy, no carotid bruit, no JVD, supple, symmetrical, trachea midline and

## 2019-08-06 NOTE — PROGRESS NOTES
Medical Nutrition Therapy (MNT) Assessment     Pt. Name: Michelle Glover   Date:8/6/2019  F/U Appt: Sx Type 2 week post op RYGB    Food Records Kept: Yes  24Hour Recall Completed at Office:No    Ht 6' (1.829 m)   Wt (!) 347 lb (157.4 kg)   BMI 47.06 kg/m²  Height: 6' (1.829 m) Weight: (!) 347 lb (157.4 kg)   IBW:ideal body weight   170 lb % EBWL: 13%     Wt Readings from Last 3 Encounters:   08/06/19 (!) 347 lb (157.4 kg)   08/06/19 (!) 347 lb (157.4 kg)   07/23/19 (!) 363 lb 4.8 oz (164.8 kg)                     Protein supplements: Pt. is currently using the following protein supplement Nectar and consuming the following grams of protein 88. Rd / Ld reviewed with patient based on 1.0 gram per kg of IBW patient needs 77-87 grams of protein total daily.       Subjective:                   Current MNT:       Bariatric full liquid diet with MVI, Calcium & Protein Supplements     MNT Advanced to:     Bariatric puree diet with MVI, Calcium & Protein Supplements      Allergies and Food Allergies and Food Intolerances: none    Nutritional Data  No - Poor appetite more than 5 days     No - NPO or clear liquid more than 3 days     No - Problem Chewing      No - Problem Swallowing      No - Problem Mouth Pain      No - Problem Denture  No - Missing Teeth         No - Pressure Sore    No - Open Wound    No - Surgical Wound  No - Documented: Sepsis or Infection    No - Nausea  No - Vomiting    SWLC Bowel Protocol:  Patient states he / she has the following bowel movements per week 3-4  no - Diarrhea  No - Steatorrhea  No - Constipation  When was your last bowel movement yesterday  How much plain water are you drinking daily 50-54 oz  What other beverages / fluids are you drinking daily and the amount protein shake  No - Are you taking Colace daily  What amount of Colace are you taking daily none  No - Are you taking Sugar Free Chewable Fiber Gummies  What amount of Sugar Free Chewable Fiber Gummies are you taking daily Likely  - What is your readiness to change and adhere to your new diet and lifestyle change - 10  At a level 10 How do you foresee yourself being successful with the change 10     MEDICAL NUTRITION THERAPY (MNT) - Nutrition Care Plan   (The patient has been educated and given written education material that reinforces the following dietary guidelines for Bariatric Surgery)  Goal:  Patient able to verbalize the following dietary concepts:  3 to 4 ounce portions per meal     6 small meals daily      60 to 80 grams of protein   48 to 64 ounces of fluid daily (water)     Always consume protein item first with all meals   Pt is aware wt loss is pt controlled. Slow Meals - 30-35 chews per bite / Meals 30 - 45 minutes long            The RD / LD reviewed with the patient that the dietary goals of the bariatric patient is to ensure that patient is able to meet established nutritional needs for a Bariatric Surgery  Patient at this time in order to promote healing, prevent significant weight changes, prevent skin breakdown and abnormal lab values, prevent complications, and tolerance to diet and texture of foods. Pt is able to identify proper food choices and needed changes and is able to explain proper food preparation. RD / LD reviewed compliance with assigned diet stages, volume of food and drink consumed, timing of meals, amount of protein and energy intake, daily vitamin and mineral supplementation, identify food cravings and any adverse reactions associated with intake of food and drink. RD / LD uses behavioral tools such as goal setting, MI, and self-monitoring, to reinforce the anatomic and physiologic effects of the surgery, so that the described behaviors become more of a habit not simply a reaction to the altered anatomy.      Care Plan:   · Rd/Ld Addressed Food Records or 24-Hour Recall  · Rd / Ld encouraged patient to exercise at least 30 minutes daily  · Rd / Ld instructed patient on how to increase oral

## 2019-08-21 ENCOUNTER — TELEPHONE (OUTPATIENT)
Dept: BARIATRICS/WEIGHT MGMT | Age: 34
End: 2019-08-21

## 2019-08-28 ENCOUNTER — HOSPITAL ENCOUNTER (OUTPATIENT)
Age: 34
Discharge: HOME | End: 2019-08-28
Payer: COMMERCIAL

## 2019-08-28 LAB
ALBUMIN SERPL-MCNC: 4.1 G/DL (ref 3.5–5.2)
ALP BLD-CCNC: 95 U/L (ref 40–129)
ALT SERPL-CCNC: 66 U/L (ref 0–40)
ANION GAP SERPL CALCULATED.3IONS-SCNC: 17 MMOL/L (ref 7–16)
AST SERPL-CCNC: 40 U/L (ref 0–39)
BILIRUB SERPL-MCNC: 0.7 MG/DL (ref 0–1.2)
BUN BLDV-MCNC: 10 MG/DL (ref 6–20)
CALCIUM SERPL-MCNC: 9.5 MG/DL (ref 8.6–10.2)
CHLORIDE BLD-SCNC: 97 MMOL/L (ref 98–107)
CO2: 26 MMOL/L (ref 22–29)
CREAT SERPL-MCNC: 0.8 MG/DL (ref 0.7–1.2)
GFR AFRICAN AMERICAN: >60
GFR NON-AFRICAN AMERICAN: >60 ML/MIN/1.73
GLUCOSE BLD-MCNC: 87 MG/DL (ref 74–99)
HCT VFR BLD CALC: 44.2 % (ref 37–54)
HEMOGLOBIN: 14.5 G/DL (ref 12.5–16.5)
MCH RBC QN AUTO: 28.6 PG (ref 26–35)
MCHC RBC AUTO-ENTMCNC: 32.8 % (ref 32–34.5)
MCV RBC AUTO: 87.2 FL (ref 80–99.9)
PDW BLD-RTO: 14.6 FL (ref 11.5–15)
PLATELET # BLD: 279 E9/L (ref 130–450)
PMV BLD AUTO: 10.8 FL (ref 7–12)
POTASSIUM SERPL-SCNC: 4.1 MMOL/L (ref 3.5–5)
RBC # BLD: 5.07 E12/L (ref 3.8–5.8)
SODIUM BLD-SCNC: 140 MMOL/L (ref 132–146)
TOTAL PROTEIN: 8.1 G/DL (ref 6.4–8.3)
WBC # BLD: 10.4 E9/L (ref 4.5–11.5)

## 2019-08-28 PROCEDURE — 82652 VIT D 1 25-DIHYDROXY: CPT

## 2019-08-28 PROCEDURE — 84425 ASSAY OF VITAMIN B-1: CPT

## 2019-08-28 PROCEDURE — 82607 VITAMIN B-12: CPT

## 2019-08-28 PROCEDURE — 82746 ASSAY OF FOLIC ACID SERUM: CPT

## 2019-08-28 PROCEDURE — 36415 COLL VENOUS BLD VENIPUNCTURE: CPT

## 2019-08-28 PROCEDURE — 82728 ASSAY OF FERRITIN: CPT

## 2019-08-28 PROCEDURE — 85027 COMPLETE CBC AUTOMATED: CPT

## 2019-08-28 PROCEDURE — 80053 COMPREHEN METABOLIC PANEL: CPT

## 2019-08-28 PROCEDURE — 80061 LIPID PANEL: CPT

## 2019-08-28 PROCEDURE — 84134 ASSAY OF PREALBUMIN: CPT

## 2019-08-28 PROCEDURE — 84630 ASSAY OF ZINC: CPT

## 2019-08-29 LAB
CHOLESTEROL, TOTAL: 137 MG/DL (ref 0–199)
FERRITIN: 158 NG/ML
FOLATE: 12.1 NG/ML (ref 4.8–24.2)
HDLC SERPL-MCNC: 36 MG/DL
LDL CHOLESTEROL CALCULATED: 87 MG/DL (ref 0–99)
PREALBUMIN: 10 MG/DL (ref 20–40)
TRIGL SERPL-MCNC: 71 MG/DL (ref 0–149)
VITAMIN B-12: >2000 PG/ML (ref 211–946)
VLDLC SERPL CALC-MCNC: 14 MG/DL

## 2019-08-31 LAB
VITAMIN D 1,25-DIHYDROXY: 56.9 PG/ML (ref 19.9–79.3)
ZINC: 85.4 UG/DL (ref 60–120)

## 2019-09-03 LAB — VITAMIN B1 WHOLE BLOOD: 59 NMOL/L (ref 70–180)

## 2019-09-06 ENCOUNTER — INITIAL CONSULT (OUTPATIENT)
Dept: BARIATRICS/WEIGHT MGMT | Age: 34
End: 2019-09-06
Payer: COMMERCIAL

## 2019-09-06 ENCOUNTER — OFFICE VISIT (OUTPATIENT)
Dept: BARIATRICS/WEIGHT MGMT | Age: 34
End: 2019-09-06
Payer: COMMERCIAL

## 2019-09-06 VITALS — HEIGHT: 72 IN | WEIGHT: 315 LBS | BODY MASS INDEX: 42.66 KG/M2

## 2019-09-06 VITALS
HEART RATE: 76 BPM | SYSTOLIC BLOOD PRESSURE: 130 MMHG | DIASTOLIC BLOOD PRESSURE: 78 MMHG | TEMPERATURE: 96 F | RESPIRATION RATE: 20 BRPM | WEIGHT: 315 LBS | HEIGHT: 72 IN | BODY MASS INDEX: 42.66 KG/M2

## 2019-09-06 DIAGNOSIS — Z71.3 DIETARY COUNSELING: Primary | ICD-10-CM

## 2019-09-06 DIAGNOSIS — K91.2 MALNUTRITION FOLLOWING GASTROINTESTINAL SURGERY: Primary | ICD-10-CM

## 2019-09-06 PROCEDURE — 99024 POSTOP FOLLOW-UP VISIT: CPT | Performed by: SURGERY

## 2019-09-06 PROCEDURE — 99999 PR OFFICE/OUTPT VISIT,PROCEDURE ONLY: CPT

## 2019-09-06 PROCEDURE — 99211 OFF/OP EST MAY X REQ PHY/QHP: CPT

## 2019-09-06 RX ORDER — AMOXICILLIN AND CLAVULANATE POTASSIUM 250; 125 MG/1; MG/1
1 TABLET, FILM COATED ORAL 3 TIMES DAILY
COMMUNITY
End: 2019-10-25 | Stop reason: ALTCHOICE

## 2019-10-16 ENCOUNTER — HOSPITAL ENCOUNTER (OUTPATIENT)
Age: 34
Discharge: HOME | End: 2019-10-16
Payer: COMMERCIAL

## 2019-10-16 LAB
ALBUMIN SERPL-MCNC: 3.9 G/DL (ref 3.5–5.2)
ALP BLD-CCNC: 98 U/L (ref 40–129)
ALT SERPL-CCNC: 87 U/L (ref 0–40)
ANION GAP SERPL CALCULATED.3IONS-SCNC: 12 MMOL/L (ref 7–16)
AST SERPL-CCNC: 48 U/L (ref 0–39)
BILIRUB SERPL-MCNC: 0.8 MG/DL (ref 0–1.2)
BUN BLDV-MCNC: 16 MG/DL (ref 6–20)
CALCIUM SERPL-MCNC: 9.4 MG/DL (ref 8.6–10.2)
CHLORIDE BLD-SCNC: 103 MMOL/L (ref 98–107)
CHOLESTEROL, TOTAL: 131 MG/DL (ref 0–199)
CO2: 27 MMOL/L (ref 22–29)
CREAT SERPL-MCNC: 0.9 MG/DL (ref 0.7–1.2)
FERRITIN: 125 NG/ML
FOLATE: 16.3 NG/ML (ref 4.8–24.2)
GFR AFRICAN AMERICAN: >60
GFR NON-AFRICAN AMERICAN: >60 ML/MIN/1.73
GLUCOSE BLD-MCNC: 94 MG/DL (ref 74–99)
HCT VFR BLD CALC: 42.4 % (ref 37–54)
HEMOGLOBIN: 13.5 G/DL (ref 12.5–16.5)
MCH RBC QN AUTO: 28.2 PG (ref 26–35)
MCHC RBC AUTO-ENTMCNC: 31.8 % (ref 32–34.5)
MCV RBC AUTO: 88.5 FL (ref 80–99.9)
PDW BLD-RTO: 15.9 FL (ref 11.5–15)
PLATELET # BLD: 265 E9/L (ref 130–450)
PMV BLD AUTO: 11.1 FL (ref 7–12)
POTASSIUM SERPL-SCNC: 3.9 MMOL/L (ref 3.5–5)
PREALBUMIN: 10 MG/DL (ref 20–40)
RBC # BLD: 4.79 E12/L (ref 3.8–5.8)
SODIUM BLD-SCNC: 142 MMOL/L (ref 132–146)
TOTAL PROTEIN: 7.8 G/DL (ref 6.4–8.3)
TRIGL SERPL-MCNC: 75 MG/DL (ref 0–149)
VITAMIN B-12: 1341 PG/ML (ref 211–946)
VITAMIN D 25-HYDROXY: 67 NG/ML (ref 30–100)
WBC # BLD: 8.6 E9/L (ref 4.5–11.5)

## 2019-10-16 PROCEDURE — 82306 VITAMIN D 25 HYDROXY: CPT

## 2019-10-16 PROCEDURE — 82746 ASSAY OF FOLIC ACID SERUM: CPT

## 2019-10-16 PROCEDURE — 82728 ASSAY OF FERRITIN: CPT

## 2019-10-16 PROCEDURE — 84134 ASSAY OF PREALBUMIN: CPT

## 2019-10-16 PROCEDURE — 82465 ASSAY BLD/SERUM CHOLESTEROL: CPT

## 2019-10-16 PROCEDURE — 84425 ASSAY OF VITAMIN B-1: CPT

## 2019-10-16 PROCEDURE — 85027 COMPLETE CBC AUTOMATED: CPT

## 2019-10-16 PROCEDURE — 84478 ASSAY OF TRIGLYCERIDES: CPT

## 2019-10-16 PROCEDURE — 80053 COMPREHEN METABOLIC PANEL: CPT

## 2019-10-16 PROCEDURE — 82607 VITAMIN B-12: CPT

## 2019-10-16 PROCEDURE — 36415 COLL VENOUS BLD VENIPUNCTURE: CPT

## 2019-10-16 PROCEDURE — 84630 ASSAY OF ZINC: CPT

## 2019-10-19 LAB — ZINC: 77.7 UG/DL (ref 60–120)

## 2019-10-20 LAB — VITAMIN B1 WHOLE BLOOD: 136 NMOL/L (ref 70–180)

## 2019-10-25 ENCOUNTER — INITIAL CONSULT (OUTPATIENT)
Dept: BARIATRICS/WEIGHT MGMT | Age: 34
End: 2019-10-25
Payer: COMMERCIAL

## 2019-10-25 ENCOUNTER — OFFICE VISIT (OUTPATIENT)
Dept: BARIATRICS/WEIGHT MGMT | Age: 34
End: 2019-10-25
Payer: COMMERCIAL

## 2019-10-25 VITALS — HEIGHT: 72 IN | WEIGHT: 300 LBS | BODY MASS INDEX: 40.63 KG/M2

## 2019-10-25 VITALS
HEART RATE: 61 BPM | HEIGHT: 72 IN | SYSTOLIC BLOOD PRESSURE: 120 MMHG | WEIGHT: 300 LBS | BODY MASS INDEX: 40.63 KG/M2 | DIASTOLIC BLOOD PRESSURE: 70 MMHG | TEMPERATURE: 96.7 F | RESPIRATION RATE: 20 BRPM

## 2019-10-25 DIAGNOSIS — Z71.3 NUTRITIONAL COUNSELING: Primary | ICD-10-CM

## 2019-10-25 DIAGNOSIS — K91.2 MALNUTRITION FOLLOWING GASTROINTESTINAL SURGERY: Primary | ICD-10-CM

## 2019-10-25 PROCEDURE — 99213 OFFICE O/P EST LOW 20 MIN: CPT | Performed by: SURGERY

## 2019-12-11 ENCOUNTER — TELEPHONE (OUTPATIENT)
Dept: BARIATRICS/WEIGHT MGMT | Age: 34
End: 2019-12-11

## 2019-12-13 ENCOUNTER — OFFICE VISIT (OUTPATIENT)
Dept: BARIATRICS/WEIGHT MGMT | Age: 34
End: 2019-12-13
Payer: COMMERCIAL

## 2019-12-13 VITALS
HEIGHT: 72 IN | RESPIRATION RATE: 20 BRPM | BODY MASS INDEX: 36.98 KG/M2 | DIASTOLIC BLOOD PRESSURE: 62 MMHG | SYSTOLIC BLOOD PRESSURE: 111 MMHG | HEART RATE: 67 BPM | WEIGHT: 273 LBS | TEMPERATURE: 97.6 F

## 2019-12-13 DIAGNOSIS — R10.13 EPIGASTRIC PAIN: Primary | ICD-10-CM

## 2019-12-13 DIAGNOSIS — R07.81 COSTAL MARGIN PAIN: ICD-10-CM

## 2019-12-13 PROCEDURE — 99214 OFFICE O/P EST MOD 30 MIN: CPT | Performed by: SURGERY

## 2019-12-13 PROCEDURE — 99211 OFF/OP EST MAY X REQ PHY/QHP: CPT | Performed by: SURGERY

## 2019-12-13 RX ORDER — CYCLOBENZAPRINE HCL 10 MG
10 TABLET ORAL DAILY
Refills: 0 | COMMUNITY
Start: 2019-12-11 | End: 2020-07-24

## 2019-12-13 RX ORDER — MONTELUKAST SODIUM 10 MG/1
10 TABLET ORAL NIGHTLY
COMMUNITY
End: 2020-08-13

## 2020-01-14 ENCOUNTER — HOSPITAL ENCOUNTER (OUTPATIENT)
Age: 35
Discharge: HOME | End: 2020-01-14
Payer: COMMERCIAL

## 2020-01-14 LAB
ALBUMIN SERPL-MCNC: 4.1 G/DL (ref 3.5–5.2)
ALP BLD-CCNC: 106 U/L (ref 40–129)
ALT SERPL-CCNC: 39 U/L (ref 0–40)
ANION GAP SERPL CALCULATED.3IONS-SCNC: 13 MMOL/L (ref 7–16)
AST SERPL-CCNC: 29 U/L (ref 0–39)
BILIRUB SERPL-MCNC: 0.9 MG/DL (ref 0–1.2)
BUN BLDV-MCNC: 11 MG/DL (ref 6–20)
CALCIUM SERPL-MCNC: 9.8 MG/DL (ref 8.6–10.2)
CHLORIDE BLD-SCNC: 101 MMOL/L (ref 98–107)
CHOLESTEROL, TOTAL: 141 MG/DL (ref 0–199)
CO2: 27 MMOL/L (ref 22–29)
CREAT SERPL-MCNC: 0.8 MG/DL (ref 0.7–1.2)
FERRITIN: 154 NG/ML
FOLATE: 14.7 NG/ML (ref 4.8–24.2)
GFR AFRICAN AMERICAN: >60
GFR NON-AFRICAN AMERICAN: >60 ML/MIN/1.73
GLUCOSE BLD-MCNC: 89 MG/DL (ref 74–99)
HCT VFR BLD CALC: 44 % (ref 37–54)
HDLC SERPL-MCNC: 44 MG/DL
HEMOGLOBIN: 14.1 G/DL (ref 12.5–16.5)
LDL CHOLESTEROL CALCULATED: 84 MG/DL (ref 0–99)
MCH RBC QN AUTO: 28.3 PG (ref 26–35)
MCHC RBC AUTO-ENTMCNC: 32 % (ref 32–34.5)
MCV RBC AUTO: 88.4 FL (ref 80–99.9)
PDW BLD-RTO: 14.6 FL (ref 11.5–15)
PLATELET # BLD: 269 E9/L (ref 130–450)
PMV BLD AUTO: 10.8 FL (ref 7–12)
POTASSIUM SERPL-SCNC: 4.1 MMOL/L (ref 3.5–5)
PREALBUMIN: 10 MG/DL (ref 20–40)
RBC # BLD: 4.98 E12/L (ref 3.8–5.8)
SODIUM BLD-SCNC: 141 MMOL/L (ref 132–146)
TOTAL PROTEIN: 8 G/DL (ref 6.4–8.3)
TRIGL SERPL-MCNC: 65 MG/DL (ref 0–149)
VITAMIN B-12: 1081 PG/ML (ref 211–946)
VITAMIN D 25-HYDROXY: 72 NG/ML (ref 30–100)
VLDLC SERPL CALC-MCNC: 13 MG/DL
WBC # BLD: 7.7 E9/L (ref 4.5–11.5)

## 2020-01-14 PROCEDURE — 80053 COMPREHEN METABOLIC PANEL: CPT

## 2020-01-14 PROCEDURE — 36415 COLL VENOUS BLD VENIPUNCTURE: CPT

## 2020-01-14 PROCEDURE — 85027 COMPLETE CBC AUTOMATED: CPT

## 2020-01-14 PROCEDURE — 82607 VITAMIN B-12: CPT

## 2020-01-14 PROCEDURE — 84425 ASSAY OF VITAMIN B-1: CPT

## 2020-01-14 PROCEDURE — 80061 LIPID PANEL: CPT

## 2020-01-14 PROCEDURE — 82306 VITAMIN D 25 HYDROXY: CPT

## 2020-01-14 PROCEDURE — 82728 ASSAY OF FERRITIN: CPT

## 2020-01-14 PROCEDURE — 84630 ASSAY OF ZINC: CPT

## 2020-01-14 PROCEDURE — 84134 ASSAY OF PREALBUMIN: CPT

## 2020-01-14 PROCEDURE — 82746 ASSAY OF FOLIC ACID SERUM: CPT

## 2020-01-17 LAB — ZINC: 74.4 UG/DL (ref 60–120)

## 2020-01-19 LAB — VITAMIN B1 WHOLE BLOOD: 157 NMOL/L (ref 70–180)

## 2020-01-24 ENCOUNTER — INITIAL CONSULT (OUTPATIENT)
Dept: BARIATRICS/WEIGHT MGMT | Age: 35
End: 2020-01-24
Payer: COMMERCIAL

## 2020-01-24 ENCOUNTER — OFFICE VISIT (OUTPATIENT)
Dept: BARIATRICS/WEIGHT MGMT | Age: 35
End: 2020-01-24
Payer: COMMERCIAL

## 2020-01-24 VITALS
DIASTOLIC BLOOD PRESSURE: 67 MMHG | WEIGHT: 249 LBS | SYSTOLIC BLOOD PRESSURE: 119 MMHG | HEART RATE: 80 BPM | BODY MASS INDEX: 33.72 KG/M2 | TEMPERATURE: 97.8 F | HEIGHT: 72 IN | RESPIRATION RATE: 20 BRPM

## 2020-01-24 VITALS — HEIGHT: 72 IN | BODY MASS INDEX: 33.72 KG/M2 | WEIGHT: 249 LBS

## 2020-01-24 PROCEDURE — 99213 OFFICE O/P EST LOW 20 MIN: CPT | Performed by: SURGERY

## 2020-01-24 PROCEDURE — 99212 OFFICE O/P EST SF 10 MIN: CPT

## 2020-01-24 PROCEDURE — 99999 PR OFFICE/OUTPT VISIT,PROCEDURE ONLY: CPT | Performed by: DIETITIAN, REGISTERED

## 2020-01-24 NOTE — PROGRESS NOTES
Donna Mayes  1/24/2020  Laparoscopic Chaz-en- Y Gastric Bypass  6 months Post-Operative Follow-up. Subjective:   Donna Mayes is a 30 y.o. male six months post Laparoscopic Chaz-en-Y Gastric Bypass. No issues  He is not having swallowing difficulty, is compliant most of the time with the multivitamins and calcium + Vit D. He is meeting fluid recommendations of at least 64 ounces per day and is meeting protein recommendations. Exercise: cardiovascular equipment 30 minutes/day- 3 days/week, strength training 30 minutes/day- 3 days/week and Basketball. Weight=249 lb (112.9 kg)  Today's weight represents a weight loss to date of 126 pounds. Allergies: Patient has no known allergies. Prior to Admission medications    Medication Sig Start Date End Date Taking? Authorizing Provider   cyclobenzaprine (FLEXERIL) 10 MG tablet Take 10 mg by mouth daily 12/11/19  Yes Historical Provider, MD   IRON PO Take 29 mg by mouth daily   Yes Historical Provider, MD   Calcium Carbonate (CALCI-CHEW PO) Take 3 tablets by mouth daily Indications: Bar Adv   Yes Historical Provider, MD   Multiple Vitamin (MULTI-VITAMIN DAILY PO) Take 2 tablets by mouth daily Indications:  Bar Adv    Yes Historical Provider, MD   Cholecalciferol (VITAMIN D3) 5000 units CAPS Take one capsule every day 5/24/19  Yes Lori Littlejohn MD   montelukast (SINGULAIR) 10 MG tablet Take 10 mg by mouth nightly    Historical Provider, MD   omeprazole (PRILOSEC) 20 MG delayed release capsule Take 1 capsule by mouth Daily  Patient not taking: Reported on 1/24/2020 7/5/19 7/4/20  Fide Lugo MD           Past Medical History:   Diagnosis Date    Morbid obesity due to excess calories [facility])        Past Surgical History:   Procedure Laterality Date    ABDOMEN SURGERY      ADENOIDECTOMY      ENDOSCOPY, COLON, DIAGNOSTIC  03/05/2018    with biopsy    FRACTURE SURGERY      broken L arm x2 and L leg    CHAZ-EN-Y GASTRIC BYPASS N/A 7/23/2019    GASTRIC BYPASS CHAZ-EN-Y LAPAROSCOPIC ROBOTIC XI performed by Florida Lopez MD at [address] [place] [address] Dr  2016    WISDOM TOOTH EXTRACTION  2011       Current Outpatient Medications   Medication Sig Dispense Refill    cyclobenzaprine (FLEXERIL) 10 MG tablet Take 10 mg by mouth daily  0    IRON PO Take 29 mg by mouth daily      Calcium Carbonate (CALCI-CHEW PO) Take 3 tablets by mouth daily Indications: Bar Adv      Multiple Vitamin (MULTI-VITAMIN DAILY PO) Take 2 tablets by mouth daily Indications: Bar Adv       Cholecalciferol (VITAMIN D3) 5000 units CAPS Take one capsule every day 180 capsule 1    montelukast (SINGULAIR) 10 MG tablet Take 10 mg by mouth nightly      omeprazole (PRILOSEC) 20 MG delayed release capsule Take 1 capsule by mouth Daily (Patient not taking: Reported on 1/24/2020) 30 capsule 12     No current facility-administered medications for this visit. No Known Allergies    History reviewed. No pertinent family history.     Social History     Socioeconomic History    Marital status:      Spouse name: Not on file    Number of children: Not on file    Years of education: Not on file    Highest education level: Not on file   Occupational History    Not on file   Social Needs    Financial resource strain: Not on file    Food insecurity:     Worry: Not on file     Inability: Not on file    Transportation needs:     Medical: Not on file     Non-medical: Not on file   Tobacco Use    Smoking status: Never Smoker    Smokeless tobacco: Never Used   Substance and Sexual Activity    Alcohol use: No    Drug use: No    Sexual activity: Yes   Lifestyle    Physical activity:     Days per week: Not on file     Minutes per session: Not on file    Stress: Not on file   Relationships    Social connections:     Talks on phone: Not on file     Gets together: Not on file     Attends Yarsanism service: Not on file     Active member of club or organization: Not on file 6 months post Laparoscopic Keyshawn-en- Y Gastric Bypass. He does not complain of GERD,  does not have sleep apnea,  does not have diabetes,  does not have hypertension off medical treatment. Cholesterol and triglycerides are normal.    Plan:  Doing well, no pain, costal pain has resolved. Continue to eat small portions very slowly and chew well before swallowing. High protein, low calorie diet. Drink plenty of water. Try to exercise more, maintain adequate variety and balance. Follow up in 6 months. Always call the clinic if you have any medical problems. Stop the Omeprazole or other acid reducing medicine if possible. If ulcer pain or acid reflux symptoms start, restart the Omeprazole and call the clinic.       Physician Signature: Electronically signed by Dr. Sivan Hernandez MD

## 2020-01-24 NOTE — PATIENT INSTRUCTIONS
Please continue to take your vitamin and mineral supplements as instructed. If you received a blood work prescription today for laboratory monitoring due prior to your next routine follow-up visit, please have this blood work obtained 10 to 14 days prior to your next visit. It is important to fast for 12 hours prior to routine weight loss surgery blood work, EXCEPT for drinking water, to ensure accuracy of results. Please report nausea, vomiting, abdominal pain, or any other problems you experience to your surgeon. For problems related to weight loss surgery, it is best to go to [address] [place] [address] Emergency Department and have your surgeon paged.

## 2020-01-24 NOTE — PROGRESS NOTES
Shake  No - Are you taking Colace daily  What amount of Colace are you taking daily  - None  Yes - Are you taking Sugar Free Chewable Fiber Gummies  What amount of Sugar Free Chewable Fiber Gummies are you taking daily  - Metamucil PRN  No - Did your surgeon discuss constipation with you? No - Did your surgeon discuss increasing water intake? How much water were you instructed to take daily? N/A   No - Did your surgeon discuss continuing Colace? How much Colace did your surgeon instruct you to take? N/A  No - Did your surgeon discuss stopping Colace? No - Did your surgeon discuss starting Fiber Gummies? How much Fiber Gummies did your surgeon instruct you to take? Continue PRN  Did your surgeon discuss any other medications / supplements to take daily to move your bowels and avoid constipation? No  Yes Patient was provided today the Constipation Handout  Yes Rd Ld reinforced pt needs to consume the following - Water Intake should be 64 - 90 oz, Fiber Chews 15 grams, Dietary Fiber Intake 25 - 35 grams        No - Hair loss   No - Weight regain       No - Acid Reflux  No - Dumping Syndrome      No - Food gets stuck  Yes - Are you eating solids - should not be eating solids until 6 weeks post-op. not applicable - Night Time Coughing  not applicable -  B Ping Noted  Yes - Are you chewing thoroughly  - Does not take effect until 6 weeks post-op  Yes - Are you hungry after eating     How many meals a day 5 / Portions Sizes of Meals are  4oz         Labs: 1/14/20 - Vitamin B12 1081H, Prealbumin 10L    Supplements: Bariatric Advantage Multi-Vitamin 1 tablet 2 times Daily, Bariatric Advantage 29 mgs Iron 1 tablet Daily, Bariatric Advanatge Calcium Lozenges 1 tablet 3 times Daily , Dry Vitamin D3 5,000iu every day    Estimated Daily Nutritional Needs: Based on Bariatric procedure for Wt.  Loss  Energy: Will be calculated at Maintenance Stage    Protein: 60 - 80 gms Daily    MNT Plan and Additional Education: RD/LD become more of a habit not simply a reaction to the altered anatomy. Care Plan:   · Rd/Ld Addressed Food Records or 24-Hour Recall  · Rd / Ld encouraged patient to exercise at least 30 minutes daily  · Rd / Ld instructed patient on how to increase oral protein intake within the diet. Pt. can verbalize he / she will need to consume 60 - 80 grams. · Rd / Ld instructed the patient on how to increase the use of protein supplements within the diet. · Pt. was instructed on how to increase water intake. Patient will need to consume 48 - 64 oz. of just plain water in addition to 30 oz. of non-caloric beverages. · Handouts given to patient  · RD / LD encouraged oral intake    · RD / LD encouraged pt. to keep food records.       · Pt. is able to verbalize diet concepts      Yes - Constipation Handout Given and Reviewed    Yes - High Fiber Handout Given and Reviewed      No - Ulcer Handout Given and Reviewed          No - Pt. was instructed to continue current MNT   Yes - Pt. diet was advanced to the following stage ( See above)   No - Supplements: initiated (See list below  - Pt. given instruction)     No - Supplemental foods:______________________  No - Pt. was placed on a altered meal schedule    Good - Dietary Compliance

## 2020-07-14 ENCOUNTER — HOSPITAL ENCOUNTER (OUTPATIENT)
Age: 35
Discharge: HOME | End: 2020-07-14
Payer: COMMERCIAL

## 2020-07-14 LAB
ALBUMIN SERPL-MCNC: 4.2 G/DL (ref 3.5–5.2)
ALP BLD-CCNC: 113 U/L (ref 40–129)
ALT SERPL-CCNC: 25 U/L (ref 0–40)
ANION GAP SERPL CALCULATED.3IONS-SCNC: 9 MMOL/L (ref 7–16)
AST SERPL-CCNC: 16 U/L (ref 0–39)
BILIRUB SERPL-MCNC: 0.9 MG/DL (ref 0–1.2)
BUN BLDV-MCNC: 18 MG/DL (ref 6–20)
CALCIUM SERPL-MCNC: 9.8 MG/DL (ref 8.6–10.2)
CHLORIDE BLD-SCNC: 101 MMOL/L (ref 98–107)
CHOLESTEROL, TOTAL: 136 MG/DL (ref 0–199)
CO2: 28 MMOL/L (ref 22–29)
CREAT SERPL-MCNC: 1 MG/DL (ref 0.7–1.2)
FERRITIN: 155 NG/ML
FOLATE: 17.2 NG/ML (ref 4.8–24.2)
GFR AFRICAN AMERICAN: >60
GFR NON-AFRICAN AMERICAN: >60 ML/MIN/1.73
GLUCOSE BLD-MCNC: 96 MG/DL (ref 74–99)
HCT VFR BLD CALC: 43.2 % (ref 37–54)
HEMOGLOBIN: 14.6 G/DL (ref 12.5–16.5)
MCH RBC QN AUTO: 29.7 PG (ref 26–35)
MCHC RBC AUTO-ENTMCNC: 33.8 % (ref 32–34.5)
MCV RBC AUTO: 88 FL (ref 80–99.9)
PDW BLD-RTO: 13.9 FL (ref 11.5–15)
PLATELET # BLD: 233 E9/L (ref 130–450)
PMV BLD AUTO: 10.2 FL (ref 7–12)
POTASSIUM SERPL-SCNC: 4.4 MMOL/L (ref 3.5–5)
PREALBUMIN: 13 MG/DL (ref 20–40)
RBC # BLD: 4.91 E12/L (ref 3.8–5.8)
SODIUM BLD-SCNC: 138 MMOL/L (ref 132–146)
TOTAL PROTEIN: 7.6 G/DL (ref 6.4–8.3)
TRIGL SERPL-MCNC: 74 MG/DL (ref 0–149)
VITAMIN B-12: 915 PG/ML (ref 211–946)
VITAMIN D 25-HYDROXY: 105 NG/ML (ref 30–100)
WBC # BLD: 7.8 E9/L (ref 4.5–11.5)

## 2020-07-14 PROCEDURE — 84255 ASSAY OF SELENIUM: CPT

## 2020-07-14 PROCEDURE — 82607 VITAMIN B-12: CPT

## 2020-07-14 PROCEDURE — 36415 COLL VENOUS BLD VENIPUNCTURE: CPT

## 2020-07-14 PROCEDURE — 82728 ASSAY OF FERRITIN: CPT

## 2020-07-14 PROCEDURE — 85027 COMPLETE CBC AUTOMATED: CPT

## 2020-07-14 PROCEDURE — 82746 ASSAY OF FOLIC ACID SERUM: CPT

## 2020-07-14 PROCEDURE — 82306 VITAMIN D 25 HYDROXY: CPT

## 2020-07-14 PROCEDURE — 84630 ASSAY OF ZINC: CPT

## 2020-07-14 PROCEDURE — 80053 COMPREHEN METABOLIC PANEL: CPT

## 2020-07-14 PROCEDURE — 84425 ASSAY OF VITAMIN B-1: CPT

## 2020-07-14 PROCEDURE — 82525 ASSAY OF COPPER: CPT

## 2020-07-14 PROCEDURE — 84478 ASSAY OF TRIGLYCERIDES: CPT

## 2020-07-14 PROCEDURE — 84134 ASSAY OF PREALBUMIN: CPT

## 2020-07-14 PROCEDURE — 82465 ASSAY BLD/SERUM CHOLESTEROL: CPT

## 2020-07-18 LAB
COPPER: 125.8 UG/DL (ref 70–140)
VITAMIN B1 WHOLE BLOOD: 164 NMOL/L (ref 70–180)
ZINC: 88.8 UG/DL (ref 60–120)

## 2020-07-19 LAB — SELENIUM: 217 UG/L (ref 23–190)

## 2020-07-24 ENCOUNTER — OFFICE VISIT (OUTPATIENT)
Dept: BARIATRICS/WEIGHT MGMT | Age: 35
End: 2020-07-24
Payer: COMMERCIAL

## 2020-07-24 ENCOUNTER — TELEPHONE (OUTPATIENT)
Dept: BARIATRICS/WEIGHT MGMT | Age: 35
End: 2020-07-24

## 2020-07-24 VITALS
SYSTOLIC BLOOD PRESSURE: 111 MMHG | HEART RATE: 66 BPM | WEIGHT: 197 LBS | TEMPERATURE: 97.3 F | DIASTOLIC BLOOD PRESSURE: 60 MMHG | HEIGHT: 72 IN | RESPIRATION RATE: 20 BRPM | BODY MASS INDEX: 26.68 KG/M2

## 2020-07-24 PROCEDURE — 99213 OFFICE O/P EST LOW 20 MIN: CPT | Performed by: SURGERY

## 2020-07-24 PROCEDURE — 99211 OFF/OP EST MAY X REQ PHY/QHP: CPT

## 2020-07-24 NOTE — PROGRESS NOTES
Cheryl Pittman  7/24/2020  [address]     Chaz-en- Y Gastric Bypass  1 Year Post-Operative Follow-up     Cheryl Pittman is a 30 y.o. male who is 1 years post Laparoscopic Chaz-en-Y Gastric Bypass surgery. Reports RUQ pain. He is not having swallowing difficulty, is compliant most of the time with the multivitamins and calcium + Vit D. He is meeting fluid recommendations of at least 64 ounces per day and is meeting protein recommendations. He  is exercising: running 30 minutes/day- 5 days/week and strength training 30 minutes/day- 5 days/week. Weight=197 lb (89.4 kg)  Today's weight represents a total weight loss of 178 pounds since surgery with 0 pounds regained since the lowest weight. Prior to Admission medications    Medication Sig Start Date End Date Taking? Authorizing Provider   montelukast (SINGULAIR) 10 MG tablet Take 10 mg by mouth nightly   Yes Historical Provider, MD   IRON PO Take 29 mg by mouth daily   Yes Historical Provider, MD   Calcium Carbonate (CALCI-CHEW PO) Take 3 tablets by mouth daily Indications: Bar Adv   Yes Historical Provider, MD   Multiple Vitamin (MULTI-VITAMIN DAILY PO) Take 2 tablets by mouth daily Indications:  Bar Adv    Yes Historical Provider, MD   Cholecalciferol (VITAMIN D3) 5000 units CAPS Take one capsule every day 5/24/19  Yes Kacey Magana MD        No Known Allergies        Past Medical History:   Diagnosis Date    Morbid obesity due to excess calories [facility])        Past Surgical History:   Procedure Laterality Date    ABDOMEN SURGERY      ADENOIDECTOMY      ENDOSCOPY, COLON, DIAGNOSTIC  03/05/2018    with biopsy    FRACTURE SURGERY      broken L arm x2 and L leg    CHAZ-EN-Y GASTRIC BYPASS N/A 7/23/2019    GASTRIC BYPASS CHAZ-EN-Y LAPAROSCOPIC ROBOTIC XI performed by Sweta Infante MD at [address] [place] [address] Dr  [address]  [address] EXTRACTION  2011       Current Outpatient Medications   Medication Sig Dispense Refill    montelukast (SINGULAIR) 10 MG tablet Take 10 mg by mouth nightly      IRON PO Take 29 mg by mouth daily      Calcium Carbonate (CALCI-CHEW PO) Take 3 tablets by mouth daily Indications: Bar Adv      Multiple Vitamin (MULTI-VITAMIN DAILY PO) Take 2 tablets by mouth daily Indications: Bar Adv       Cholecalciferol (VITAMIN D3) 5000 units CAPS Take one capsule every day 180 capsule 1     No current facility-administered medications for this visit. No Known Allergies    No family history on file.     Social History     Socioeconomic History    Marital status:      Spouse name: Not on file    Number of children: Not on file    Years of education: Not on file    Highest education level: Not on file   Occupational History    Not on file   Social Needs    Financial resource strain: Not on file    Food insecurity     Worry: Not on file     Inability: Not on file    Transportation needs     Medical: Not on file     Non-medical: Not on file   Tobacco Use    Smoking status: Never Smoker    Smokeless tobacco: Never Used   Substance and Sexual Activity    Alcohol use: No    Drug use: No    Sexual activity: Yes   Lifestyle    Physical activity     Days per week: Not on file     Minutes per session: Not on file    Stress: Not on file   Relationships    Social connections     Talks on phone: Not on file     Gets together: Not on file     Attends Congregational service: Not on file     Active member of club or organization: Not on file     Attends meetings of clubs or organizations: Not on file     Relationship status: Not on file    Intimate partner violence     Fear of current or ex partner: Not on file     Emotionally abused: Not on file     Physically abused: Not on file     Forced sexual activity: Not on file   Other Topics Concern    Not on file   Social History Narrative    Not on file       A complete 10 system review was performed and are otherwise negative unless mentioned in the above HPI. Specific negatives are listed below but may not include all those reviewed. General ROS: negative obtundation, AMS  ENT ROS: negative rhinorrhea, epistaxis  Allergy and Immunology ROS: negative itchy/watery eyes or nasal congestion  Hematological and Lymphatic ROS: negative spontaneous bleeding or bruising  Endocrine ROS: negative  lethargy, mood swings, palpitations or polydipsia/polyuria  Respiratory ROS: negative sputum changes, stridor, tachypnea or wheezing  Cardiovascular ROS: negative for - loss of consciousness, murmur or orthopnea  Gastrointestinal ROS: negative for - hematochezia or hematemesis  Genito-Urinary ROS: negative for -  genital discharge or hematuria  Musculoskeletal ROS: negative for - focal weakness, gangrene  Psych/Neuro ROS: negative for - visual or auditory hallucinations, suicidal ideation        Physical exam:   /60 (Site: Left Upper Arm, Position: Sitting, Cuff Size: Large Adult)   Pulse 66   Temp 97.3 °F (36.3 °C) (Temporal)   Resp 20   Ht 6' (1.829 m)   Wt 197 lb (89.4 kg)   BMI 26.72 kg/m²    General appearance: alert, appears stated age and cooperative  Head: Normocephalic, without obvious abnormality, atraumatic  Neck: no adenopathy, supple, symmetrical, trachea midline and thyroid not enlarged, symmetric, no tenderness/mass/nodules  Lungs: clear to auscultation bilaterally  Heart: regular rate and rhythm  Abdomen: soft, non-tender; bowel sounds normal; no masses,  no organomegaly  Extremities: extremities normal, atraumatic, no cyanosis or edema    Assessment: Post Keyshawn-en- Y Gastric Bypass. He does not complain of GERD,  does not have sleep apnea,  does not have diabetes,  does not have hypertension off medical treatment. Cholesterol and triglycerides are normal. LFT elevation persissts with RUQ pain and right flank pain    Plan:  RUQ US  UA eval for heamturia and renal stones  Follow up by telephone  .  Continue to eat a high protein, low calorie diet, eat small portions very slowly and chew well before swallowing. Drink plenty of water and fluids. Make sure to use fiber to keep the bowels regular. Try to exercise 7 days per week, maintain adequate variety and balance. Always notify the clinic if you have any medical problems. Follow up in 6 months.       Physician Signature: Electronically signed by Dr. Flores Boudreaux MD

## 2020-07-24 NOTE — PROGRESS NOTES
Pt is here for his 1 yr LRYGB f/u. Labs are in [address] Water intake is  oz daily, having bowel movements daily, vitamin intake is good. Getting protein through shakes and foods. Pt states that he is having back pain.

## 2020-07-24 NOTE — PATIENT INSTRUCTIONS
Please continue to take your vitamin and mineral supplements as instructed. If you received a blood work prescription today for laboratory monitoring due prior to your next routine follow-up visit, please have this blood work obtained 10 to 14 days prior to your next visit. It is important to fast for 12 hours prior to routine weight loss surgery blood work, EXCEPT for drinking water, to ensure accuracy of results. Please report nausea, vomiting, abdominal pain, or any other problems you experience to your surgeon. For problems related to weight loss surgery, it is best to go to [address] [place] [address] Emergency Department and have your surgeon paged.

## 2020-07-28 ENCOUNTER — HOSPITAL ENCOUNTER (OUTPATIENT)
Dept: ULTRASOUND IMAGING | Age: 35
Discharge: HOME | End: 2020-07-30
Payer: COMMERCIAL

## 2020-07-28 ENCOUNTER — HOSPITAL ENCOUNTER (OUTPATIENT)
Age: 35
Discharge: HOME | End: 2020-07-28
Payer: COMMERCIAL

## 2020-07-28 LAB
BILIRUBIN URINE: NEGATIVE
BLOOD, URINE: NEGATIVE
CLARITY: CLEAR
COLOR: YELLOW
GLUCOSE URINE: NEGATIVE MG/DL
KETONES, URINE: NEGATIVE MG/DL
LEUKOCYTE ESTERASE, URINE: NEGATIVE
NITRITE, URINE: NEGATIVE
PH UA: 6 (ref 5–9)
PROTEIN UA: NEGATIVE MG/DL
SPECIFIC GRAVITY UA: 1.02 (ref 1–1.03)
UROBILINOGEN, URINE: 0.2 E.U./DL

## 2020-07-28 PROCEDURE — 81003 URINALYSIS AUTO W/O SCOPE: CPT

## 2020-07-28 PROCEDURE — 76705 ECHO EXAM OF ABDOMEN: CPT

## 2020-07-29 ENCOUNTER — TELEPHONE (OUTPATIENT)
Dept: BARIATRICS/WEIGHT MGMT | Age: 35
End: 2020-07-29

## 2020-07-29 NOTE — TELEPHONE ENCOUNTER
Per order of Dr. Tyler Hernandez patient needs scheduled for Lap/Luis Daniel. He left message for patient. Call placed to patient and he did get message. He wants to schedule for Lap/luis daniel. He would like his surgery to be done on 08/14/2020. Faxed surgery scheduling sheet to [address] [place] [address]. And patient placed on google calendar. We discussed skin prep and NPO . He will not go to hospital constipated. He is in agreement with COVID testing. Post op appointment set.

## 2020-08-10 ENCOUNTER — HOSPITAL ENCOUNTER (OUTPATIENT)
Age: 35
Discharge: HOME | End: 2020-08-12
Payer: COMMERCIAL

## 2020-08-10 PROCEDURE — U0003 INFECTIOUS AGENT DETECTION BY NUCLEIC ACID (DNA OR RNA); SEVERE ACUTE RESPIRATORY SYNDROME CORONAVIRUS 2 (SARS-COV-2) (CORONAVIRUS DISEASE [COVID-19]), AMPLIFIED PROBE TECHNIQUE, MAKING USE OF HIGH THROUGHPUT TECHNOLOGIES AS DESCRIBED BY CMS-2020-01-R: HCPCS

## 2020-08-12 LAB
SARS-COV-2: NOT DETECTED
SOURCE: NORMAL

## 2020-08-13 NOTE — PROGRESS NOTES
[address] [facility]                                                                                                                    PRE OP INSTRUCTIONS FOR  Faith Mcdonnell        Date: 8/13/2020    Date of surgery:  8/14/2020    Arrival Time: Hospital will call you between 5pm and 7pm with your final arrival time for surgery    1. Do not eat or drink anything after    Midnight   prior to surgery. This includes no water, chewing gum, mints or ice chips. 2. Take the following medications with a small sip of water on the morning of Surgery:      3. Diabetics may take evening dose of insulin but none after midnight. If you feel symptomatic or low blood sugar morning of surgery drink 1-2 ounces of apple juice only. 4. Aspirin, Ibuprofen, Advil, Naproxen, Vitamin E and other Anti-inflammatory products should be stopped  before surgery  as directed by your physician. Take Tylenol only unless instructed otherwise by your surgeon. 5. Check with your Doctor regarding stopping Plavix, Coumadin, Lovenox, Eliquis, Effient, or other blood thinners. 6. Do not smoke,use illicit drugs and do not drink any alcoholic beverages 24 hours prior to surgery. 7. You may brush your teeth the morning of surgery. DO NOT SWALLOW WATER    8. You MUST make arrangements for a responsible adult to take you home after your surgery. You will not be allowed to leave alone or drive yourself home. It is strongly suggested someone stay with you the first 24 hrs. Your surgery will be cancelled if you do not have a ride home. 9. PEDIATRIC PATIENTS ONLY:  A parent/legal guardian must accompany a child scheduled for surgery and plan to stay at the hospital until the child is discharged. Please do not bring other children with you.     10. Please wear simple, loose fitting clothing to the hospital.  Marina Inches not bring valuables (money, credit cards, checkbooks, etc.) Do not wear any makeup (including no eye makeup) or nail polish on your fingers or toes. 11. DO NOT wear any jewelry or piercings on day of surgery. All body piercing jewelry must be removed. 12. Shower the night before surgery with _x__Antibacterial soap /JOHN WIPES________    13. TOTAL JOINT REPLACEMENT/HYSTERECTOMY PATIENTS ONLY---Remember to bring Blood Bank bracelet to the hospital on the day of surgery. 14. If you have a Living Will and Durable Power of  for Healthcare, please bring in a copy. 15. If appropriate bring crutches, inspirex, WALKER, CANE etc... 12. Notify your Surgeon if you develop any illness between now and surgery time, cough, cold, fever, sore throat, nausea, vomiting, etc.  Please notify your surgeon if you experience dizziness, shortness of breath or blurred vision between now & the time of your surgery. 17. If you have ___dentures, they will be removed before going to the OR; we will provide you a container. If you wear ___contact lenses or ___glasses, they will be removed; please bring a case for them. 18. To provide excellent care visitors will be limited to 2 in the room at any given time. 19. Please bring picture ID and insurance card. 20. Sleep apnea patients need to bring CPAP AND SETTINGS to hospital on day of surgery. 21. During flu season no children under the age of 15 are permitted in the hospital for the safety of all patients. 22. Other                  Please call AMBULATORY CARE if you have any further questions.    [facility]     [address]

## 2020-08-14 ENCOUNTER — HOSPITAL ENCOUNTER (OUTPATIENT)
Dept: GENERAL RADIOLOGY | Age: 35
Discharge: HOME | End: 2020-08-16
Payer: COMMERCIAL

## 2020-08-14 ENCOUNTER — ANESTHESIA EVENT (OUTPATIENT)
Dept: OPERATING ROOM | Age: 35
End: 2020-08-14
Payer: COMMERCIAL

## 2020-08-14 ENCOUNTER — ANESTHESIA (OUTPATIENT)
Dept: OPERATING ROOM | Age: 35
End: 2020-08-14
Payer: COMMERCIAL

## 2020-08-14 ENCOUNTER — HOSPITAL ENCOUNTER (OUTPATIENT)
Age: 35
Setting detail: OUTPATIENT SURGERY
Discharge: HOME | End: 2020-08-14
Attending: SURGERY | Admitting: SURGERY
Payer: COMMERCIAL

## 2020-08-14 VITALS
WEIGHT: 196 LBS | SYSTOLIC BLOOD PRESSURE: 114 MMHG | OXYGEN SATURATION: 98 % | TEMPERATURE: 98.1 F | HEIGHT: 72 IN | RESPIRATION RATE: 18 BRPM | HEART RATE: 57 BPM | BODY MASS INDEX: 26.55 KG/M2 | DIASTOLIC BLOOD PRESSURE: 65 MMHG

## 2020-08-14 VITALS
TEMPERATURE: 98.6 F | SYSTOLIC BLOOD PRESSURE: 136 MMHG | RESPIRATION RATE: 14 BRPM | OXYGEN SATURATION: 100 % | DIASTOLIC BLOOD PRESSURE: 65 MMHG

## 2020-08-14 PROCEDURE — 6360000004 HC RX CONTRAST MEDICATION: Performed by: SURGERY

## 2020-08-14 PROCEDURE — 7100000010 HC PHASE II RECOVERY - FIRST 15 MIN: Performed by: SURGERY

## 2020-08-14 PROCEDURE — 74300 X-RAY BILE DUCTS/PANCREAS: CPT

## 2020-08-14 PROCEDURE — C1894 INTRO/SHEATH, NON-LASER: HCPCS | Performed by: SURGERY

## 2020-08-14 PROCEDURE — 6360000002 HC RX W HCPCS: Performed by: SURGERY

## 2020-08-14 PROCEDURE — 3600000014 HC SURGERY LEVEL 4 ADDTL 15MIN: Performed by: SURGERY

## 2020-08-14 PROCEDURE — 3700000001 HC ADD 15 MINUTES (ANESTHESIA): Performed by: SURGERY

## 2020-08-14 PROCEDURE — 2580000003 HC RX 258: Performed by: SURGERY

## 2020-08-14 PROCEDURE — 2500000003 HC RX 250 WO HCPCS: Performed by: NURSE ANESTHETIST, CERTIFIED REGISTERED

## 2020-08-14 PROCEDURE — 3700000000 HC ANESTHESIA ATTENDED CARE: Performed by: SURGERY

## 2020-08-14 PROCEDURE — 7100000001 HC PACU RECOVERY - ADDTL 15 MIN: Performed by: SURGERY

## 2020-08-14 PROCEDURE — 7100000011 HC PHASE II RECOVERY - ADDTL 15 MIN: Performed by: SURGERY

## 2020-08-14 PROCEDURE — 6360000002 HC RX W HCPCS: Performed by: NURSE ANESTHETIST, CERTIFIED REGISTERED

## 2020-08-14 PROCEDURE — 2500000003 HC RX 250 WO HCPCS: Performed by: SURGERY

## 2020-08-14 PROCEDURE — 7100000000 HC PACU RECOVERY - FIRST 15 MIN: Performed by: SURGERY

## 2020-08-14 PROCEDURE — 88304 TISSUE EXAM BY PATHOLOGIST: CPT

## 2020-08-14 PROCEDURE — 2709999900 HC NON-CHARGEABLE SUPPLY: Performed by: SURGERY

## 2020-08-14 PROCEDURE — 47563 LAPARO CHOLECYSTECTOMY/GRAPH: CPT | Performed by: SURGERY

## 2020-08-14 PROCEDURE — 6360000002 HC RX W HCPCS

## 2020-08-14 PROCEDURE — 3600000004 HC SURGERY LEVEL 4 BASE: Performed by: SURGERY

## 2020-08-14 RX ORDER — DEXAMETHASONE SODIUM PHOSPHATE 10 MG/ML
INJECTION, SOLUTION INTRAMUSCULAR; INTRAVENOUS PRN
Status: DISCONTINUED | OUTPATIENT
Start: 2020-08-14 | End: 2020-08-14 | Stop reason: SDUPTHER

## 2020-08-14 RX ORDER — ONDANSETRON 2 MG/ML
4 INJECTION INTRAMUSCULAR; INTRAVENOUS
Status: DISCONTINUED | OUTPATIENT
Start: 2020-08-14 | End: 2020-08-14 | Stop reason: HOSPADM

## 2020-08-14 RX ORDER — FENTANYL CITRATE 50 UG/ML
INJECTION, SOLUTION INTRAMUSCULAR; INTRAVENOUS PRN
Status: DISCONTINUED | OUTPATIENT
Start: 2020-08-14 | End: 2020-08-14 | Stop reason: SDUPTHER

## 2020-08-14 RX ORDER — MEPERIDINE HYDROCHLORIDE 25 MG/ML
25 INJECTION INTRAMUSCULAR; INTRAVENOUS; SUBCUTANEOUS EVERY 5 MIN PRN
Status: DISCONTINUED | OUTPATIENT
Start: 2020-08-14 | End: 2020-08-14 | Stop reason: HOSPADM

## 2020-08-14 RX ORDER — NEOSTIGMINE METHYLSULFATE 1 MG/ML
INJECTION, SOLUTION INTRAVENOUS PRN
Status: DISCONTINUED | OUTPATIENT
Start: 2020-08-14 | End: 2020-08-14 | Stop reason: SDUPTHER

## 2020-08-14 RX ORDER — SODIUM CHLORIDE 9 MG/ML
INJECTION, SOLUTION INTRAVENOUS CONTINUOUS
Status: DISCONTINUED | OUTPATIENT
Start: 2020-08-14 | End: 2020-08-14 | Stop reason: HOSPADM

## 2020-08-14 RX ORDER — PROPOFOL 10 MG/ML
INJECTION, EMULSION INTRAVENOUS PRN
Status: DISCONTINUED | OUTPATIENT
Start: 2020-08-14 | End: 2020-08-14 | Stop reason: SDUPTHER

## 2020-08-14 RX ORDER — SODIUM CHLORIDE 0.9 % (FLUSH) 0.9 %
10 SYRINGE (ML) INJECTION EVERY 12 HOURS SCHEDULED
Status: DISCONTINUED | OUTPATIENT
Start: 2020-08-14 | End: 2020-08-14 | Stop reason: HOSPADM

## 2020-08-14 RX ORDER — FENTANYL CITRATE 50 UG/ML
50 INJECTION, SOLUTION INTRAMUSCULAR; INTRAVENOUS EVERY 5 MIN PRN
Status: DISCONTINUED | OUTPATIENT
Start: 2020-08-14 | End: 2020-08-14 | Stop reason: HOSPADM

## 2020-08-14 RX ORDER — DOCUSATE SODIUM 100 MG/1
100 CAPSULE, LIQUID FILLED ORAL 2 TIMES DAILY
Qty: 30 CAPSULE | Refills: 0 | Status: SHIPPED | OUTPATIENT
Start: 2020-08-14 | End: 2020-08-29

## 2020-08-14 RX ORDER — CEFAZOLIN SODIUM 2 G/50ML
2 SOLUTION INTRAVENOUS
Status: COMPLETED | OUTPATIENT
Start: 2020-08-14 | End: 2020-08-14

## 2020-08-14 RX ORDER — FENTANYL CITRATE 50 UG/ML
INJECTION, SOLUTION INTRAMUSCULAR; INTRAVENOUS
Status: COMPLETED
Start: 2020-08-14 | End: 2020-08-14

## 2020-08-14 RX ORDER — ONDANSETRON 2 MG/ML
INJECTION INTRAMUSCULAR; INTRAVENOUS PRN
Status: DISCONTINUED | OUTPATIENT
Start: 2020-08-14 | End: 2020-08-14 | Stop reason: SDUPTHER

## 2020-08-14 RX ORDER — GLYCOPYRROLATE 1 MG/5 ML
SYRINGE (ML) INTRAVENOUS PRN
Status: DISCONTINUED | OUTPATIENT
Start: 2020-08-14 | End: 2020-08-14 | Stop reason: SDUPTHER

## 2020-08-14 RX ORDER — FENTANYL CITRATE 50 UG/ML
25 INJECTION, SOLUTION INTRAMUSCULAR; INTRAVENOUS EVERY 5 MIN PRN
Status: DISCONTINUED | OUTPATIENT
Start: 2020-08-14 | End: 2020-08-14 | Stop reason: HOSPADM

## 2020-08-14 RX ORDER — ROCURONIUM BROMIDE 10 MG/ML
INJECTION, SOLUTION INTRAVENOUS PRN
Status: DISCONTINUED | OUTPATIENT
Start: 2020-08-14 | End: 2020-08-14 | Stop reason: SDUPTHER

## 2020-08-14 RX ORDER — BUPIVACAINE HYDROCHLORIDE AND EPINEPHRINE 2.5; 5 MG/ML; UG/ML
INJECTION, SOLUTION EPIDURAL; INFILTRATION; INTRACAUDAL; PERINEURAL PRN
Status: DISCONTINUED | OUTPATIENT
Start: 2020-08-14 | End: 2020-08-14 | Stop reason: ALTCHOICE

## 2020-08-14 RX ORDER — ONDANSETRON 4 MG/1
4 TABLET, ORALLY DISINTEGRATING ORAL EVERY 8 HOURS PRN
Qty: 20 TABLET | Refills: 1 | Status: SHIPPED | OUTPATIENT
Start: 2020-08-14 | End: 2021-01-22

## 2020-08-14 RX ORDER — OXYCODONE HYDROCHLORIDE AND ACETAMINOPHEN 5; 325 MG/1; MG/1
1 TABLET ORAL EVERY 6 HOURS PRN
Qty: 18 TABLET | Refills: 0 | Status: SHIPPED | OUTPATIENT
Start: 2020-08-14 | End: 2020-08-19

## 2020-08-14 RX ORDER — MIDAZOLAM HYDROCHLORIDE 1 MG/ML
INJECTION INTRAMUSCULAR; INTRAVENOUS PRN
Status: DISCONTINUED | OUTPATIENT
Start: 2020-08-14 | End: 2020-08-14 | Stop reason: SDUPTHER

## 2020-08-14 RX ORDER — SODIUM CHLORIDE 0.9 % (FLUSH) 0.9 %
10 SYRINGE (ML) INJECTION PRN
Status: DISCONTINUED | OUTPATIENT
Start: 2020-08-14 | End: 2020-08-14 | Stop reason: HOSPADM

## 2020-08-14 RX ORDER — LIDOCAINE HYDROCHLORIDE 20 MG/ML
INJECTION, SOLUTION INTRAVENOUS PRN
Status: DISCONTINUED | OUTPATIENT
Start: 2020-08-14 | End: 2020-08-14 | Stop reason: SDUPTHER

## 2020-08-14 RX ORDER — MEPERIDINE HYDROCHLORIDE 25 MG/ML
INJECTION INTRAMUSCULAR; INTRAVENOUS; SUBCUTANEOUS
Status: COMPLETED
Start: 2020-08-14 | End: 2020-08-14

## 2020-08-14 RX ADMIN — Medication 0.6 MG: at 13:57

## 2020-08-14 RX ADMIN — SODIUM CHLORIDE: 9 INJECTION, SOLUTION INTRAVENOUS at 13:22

## 2020-08-14 RX ADMIN — MEPERIDINE HYDROCHLORIDE 25 MG: 25 INJECTION INTRAMUSCULAR; INTRAVENOUS; SUBCUTANEOUS at 14:40

## 2020-08-14 RX ADMIN — FENTANYL CITRATE 50 MCG: 50 INJECTION, SOLUTION INTRAMUSCULAR; INTRAVENOUS at 13:41

## 2020-08-14 RX ADMIN — ONDANSETRON 4 MG: 2 INJECTION INTRAMUSCULAR; INTRAVENOUS at 13:50

## 2020-08-14 RX ADMIN — PROPOFOL 200 MG: 10 INJECTION, EMULSION INTRAVENOUS at 13:28

## 2020-08-14 RX ADMIN — FENTANYL CITRATE 50 MCG: 50 INJECTION, SOLUTION INTRAMUSCULAR; INTRAVENOUS at 15:05

## 2020-08-14 RX ADMIN — FENTANYL CITRATE 150 MCG: 50 INJECTION, SOLUTION INTRAMUSCULAR; INTRAVENOUS at 13:28

## 2020-08-14 RX ADMIN — Medication 3 MG: at 13:57

## 2020-08-14 RX ADMIN — SODIUM CHLORIDE: 9 INJECTION, SOLUTION INTRAVENOUS at 11:50

## 2020-08-14 RX ADMIN — CEFAZOLIN SODIUM 2 G: 2 SOLUTION INTRAVENOUS at 13:26

## 2020-08-14 RX ADMIN — LIDOCAINE HYDROCHLORIDE 100 MG: 20 INJECTION, SOLUTION INTRAVENOUS at 13:28

## 2020-08-14 RX ADMIN — DEXAMETHASONE SODIUM PHOSPHATE 10 MG: 10 INJECTION, SOLUTION INTRAMUSCULAR; INTRAVENOUS at 13:31

## 2020-08-14 RX ADMIN — ROCURONIUM BROMIDE 30 MG: 10 SOLUTION INTRAVENOUS at 13:28

## 2020-08-14 RX ADMIN — MIDAZOLAM 2 MG: 1 INJECTION INTRAMUSCULAR; INTRAVENOUS at 13:22

## 2020-08-14 RX ADMIN — FENTANYL CITRATE 50 MCG: 50 INJECTION, SOLUTION INTRAMUSCULAR; INTRAVENOUS at 13:25

## 2020-08-14 RX ADMIN — Medication 0.2 MG: at 13:28

## 2020-08-14 ASSESSMENT — PAIN DESCRIPTION - LOCATION
LOCATION: ABDOMEN

## 2020-08-14 ASSESSMENT — PULMONARY FUNCTION TESTS
PIF_VALUE: 22
PIF_VALUE: 13
PIF_VALUE: 22
PIF_VALUE: 3
PIF_VALUE: 24
PIF_VALUE: 21
PIF_VALUE: 21
PIF_VALUE: 22
PIF_VALUE: 3
PIF_VALUE: 22
PIF_VALUE: 13
PIF_VALUE: 22
PIF_VALUE: 23
PIF_VALUE: 0
PIF_VALUE: 20
PIF_VALUE: 22
PIF_VALUE: 0
PIF_VALUE: 7
PIF_VALUE: 16
PIF_VALUE: 22
PIF_VALUE: 22
PIF_VALUE: 19
PIF_VALUE: 13
PIF_VALUE: 28
PIF_VALUE: 22
PIF_VALUE: 13
PIF_VALUE: 20
PIF_VALUE: 13
PIF_VALUE: 6
PIF_VALUE: 14
PIF_VALUE: 23
PIF_VALUE: 0
PIF_VALUE: 3
PIF_VALUE: 6
PIF_VALUE: 17
PIF_VALUE: 20
PIF_VALUE: 25
PIF_VALUE: 16
PIF_VALUE: 18
PIF_VALUE: 23
PIF_VALUE: 21
PIF_VALUE: 24
PIF_VALUE: 23
PIF_VALUE: 22
PIF_VALUE: 20
PIF_VALUE: 19
PIF_VALUE: 22
PIF_VALUE: 18
PIF_VALUE: 15

## 2020-08-14 ASSESSMENT — PAIN DESCRIPTION - ORIENTATION
ORIENTATION: RIGHT
ORIENTATION: RIGHT

## 2020-08-14 ASSESSMENT — PAIN SCALES - GENERAL
PAINLEVEL_OUTOF10: 0
PAINLEVEL_OUTOF10: 3
PAINLEVEL_OUTOF10: 7
PAINLEVEL_OUTOF10: 2
PAINLEVEL_OUTOF10: 3

## 2020-08-14 ASSESSMENT — PAIN - FUNCTIONAL ASSESSMENT: PAIN_FUNCTIONAL_ASSESSMENT: 0-10

## 2020-08-14 ASSESSMENT — PAIN DESCRIPTION - DESCRIPTORS
DESCRIPTORS: ACHING
DESCRIPTORS: TENDER
DESCRIPTORS: TENDER

## 2020-08-14 ASSESSMENT — PAIN DESCRIPTION - PAIN TYPE
TYPE: SURGICAL PAIN

## 2020-08-14 ASSESSMENT — PAIN DESCRIPTION - FREQUENCY: FREQUENCY: INTERMITTENT

## 2020-08-14 ASSESSMENT — PAIN DESCRIPTION - PROGRESSION: CLINICAL_PROGRESSION: NOT CHANGED

## 2020-08-14 NOTE — OP NOTE
[facility]  Operative Note      DATE OF PROCEDURE: 8/14/2020    SURGEON: Ivan Shore MD    ASSISTANT: none    PREOPERATIVE DIAGNOSIS: Symptomatic Cholelithiasis    POSTOPERATIVE DIAGNOSIS: Symptomatic Cholelithiasis    OPERATION: Laparoscopic cholecystectomy with intraoperative cholangiogram    ANESTHESIA: General endotracheal.    ESTIMATED BLOOD LOSS: Less than 10 mL. COMPLICATIONS: None. FLUIDS: Crystalloid. SPECIMEN: Gallbladder. DESCRIPTION OF PROCEDURE: This is a 30 y.o. male increasingly symptomatic right upper quadrant epigastric pain. After being explained the risks, benefits, and alternatives of the procedure, the patient agreed to proceed. We discussed at length the risks of bleeding, biliary and liver ductal injury, need for repeat or open procedures, need for catheter drainage and prolonged hospitalization. The patient agreed to proceed. The patient was taken to the operating room and placed supine on the operating room table, administered general anesthesia and intubated. Once the airway was secured and the patient was adequately sedated a time-out was performed to confirm the surgical site and the patient's name. We initially made a 5-mm incision superior to the umbilicus, inserted a Veress needle, confirmed needle placement and insufflated to 15 mmHg. We then removed the Veress needle and inserted a 5-mm trocar and inserted a camera to follow, and inspected the abdomen. Upon inspection of the abdomen, there was mild inflammation around the right upper quadrant near the gallbladder. A 12-mm trocar was inserted subxiphoid just right and lateral to the falciform ligament and two more 5-mm trocars in the right upper quadrant. Atraumatic graspers were used grasp the gallbladder and retract cephalad. We then again retracted the gallbladder cephalad and exposed the infundibulum identifying the infundibulum.  We dissected the peritoneum and omentum as well as transverse colon and duodenum off the infundibulum identifying the cystic duct junction. The cystic duct was skeletonized. The cystic artery was also identified ands skeletonized confirming it was leading directly into the gallbladder. The critical view was obtained. The 10mm titanium clip applier was used to place a single clip on the gallbladder side of the cystic duct. 2 clips proximally on the patient side and one distal on the gallbladder side of the cystic artery. A ductotomy was perfomed in the cystic duct and the Mixter catheter was inserted and clamped in place with the smith clamp. The catheter was flushed with saline first. It flushed well. A cholangiogram was performed with full strength dye showing full arborization of the intrahepatic biliary tree, common hepatic duct and cystic duct confluence into the common bile duct. Delayed imaging showed spillage of contrast into the small bowel. Our perceived anatomy was confirmed with the cholangiogram. No obstruction was appreciated. The catheter was removed, two clips were place on the patient side of the cystic duct. Pause was taken again to identify the critical view. The cystic duct was transected with laparoscopic scissors. Two clips were placed on the patient side, 1 distal on the gallbladder side of the cystic duct and it was transected with laparoscopic scissors and electrocautery. Electrocautery was then used to dissect the gallbladder from the gallbladder fossa. Traction-countertraction technique was used to expose the medial and lateral aspects of the gallbladder. Gallbladder was completely dissected off the gallbladder fossa, placed in a laparoscopic bag, and retracted through the 12-mm port site. We then inspected our clip sites which were intact. Suction irrigation was undertaken to clear out the abdomen and it was suctioned until clear. One liter of fluids was used to irrigate.  The gallbladder fossa was dry at this point and clips remained intact. We then removed the trocars under direct visualization. The abdomen was decompressed. The subxiphoid fascial incision was closed using 0 Vicryl suture and the suture closure device. Then we used 4-0 Monocryl suture to reapproximate the skin. Surgical glue was placed over the skin. The patient was awoken and extubated in the operating room without any difficulty, and transferred to the postoperative care unit in stable condition. All instrument counts, lap counts, and needle counts were correct at the completion of the procedure.     Wesley Hercules MD  Minimally Invasive and Bariatric Surgery  8/14/2020  2:01 PM

## 2020-08-14 NOTE — ANESTHESIA PRE PROCEDURE
Department of Anesthesiology  Preprocedure Note       Name:  Kenya Frias   Age:  30 y.o.  :  1985                                          MRN:  [id number]         Date:  2020      Surgeon: Coby Frikimberli):  Odalys Baugh MD    Procedure: Procedure(s):  CHOLECYSTECTOMY LAPAROSCOPIC    Medications prior to admission:   Prior to Admission medications    Medication Sig Start Date End Date Taking? Authorizing Provider   IRON PO Take 29 mg by mouth daily   Yes Historical Provider, MD   Calcium Carbonate (CALCI-CHEW PO) Take 3 tablets by mouth daily Indications: Bar Adv   Yes Historical Provider, MD   Multiple Vitamin (MULTI-VITAMIN DAILY PO) Take 2 tablets by mouth daily Indications:  Bar Adv    Yes Historical Provider, MD   Cholecalciferol (VITAMIN D3) 5000 units CAPS Take one capsule every day 19  Yes Britt Lopez MD       Current medications:    Current Facility-Administered Medications   Medication Dose Route Frequency Provider Last Rate Last Dose    0.9 % sodium chloride infusion   Intravenous Continuous Odalys Baugh  mL/hr at 20 1150      sodium chloride flush 0.9 % injection 10 mL  10 mL Intravenous 2 times per day Odalys Baugh MD        sodium chloride flush 0.9 % injection 10 mL  10 mL Intravenous PRN Odalys Baugh MD        ceFAZolin (ANCEF) 2 g in dextrose 3 % 50 mL IVPB (duplex)  2 g Intravenous On Call to Jayy Bailey MD           Allergies:  No Known Allergies    Problem List:    Patient Active Problem List   Diagnosis Code    Morbid obesity with BMI of 50.0-59.9, adult [facility] E66.01, Z68.43    Fatty liver K76.0    Hiatal hernia K44.9    S/P partial gastrectomy Z90.3    Morbid obesity due to excess calories ([facility] E66.01       Past Medical History:        Diagnosis Date    Anesthesia complication     stopped breathing with wisdom teeth removal    Morbid obesity due to excess calories [facility])        Past Surgical History:        Procedure Laterality Date    ADENOIDECTOMY      ENDOSCOPY, COLON, DIAGNOSTIC  03/05/2018    with biopsy    FRACTURE SURGERY      broken L arm x2 and L leg    CHAZ-EN-Y GASTRIC BYPASS N/A 7/23/2019    GASTRIC BYPASS CHAZ-EN-Y LAPAROSCOPIC ROBOTIC XI performed by Mica Sanz MD at [address] [place] [address] Dr  2016    WISDOM TOOTH EXTRACTION  2011       Social History:    Social History     Tobacco Use    Smoking status: Never Smoker    Smokeless tobacco: Never Used   Substance Use Topics    Alcohol use: No                                Counseling given: Not Answered      Vital Signs (Current):   Vitals:    08/13/20 0850 08/14/20 1141   BP:  124/64   Pulse:  54   Resp:  16   Temp:  98.8 °F (37.1 °C)   TempSrc:  Temporal   SpO2:  100%   Weight: 197 lb (89.4 kg) 196 lb (88.9 kg)   Height: 6' (1.829 m) 6' (1.829 m)                                              BP Readings from Last 3 Encounters:   08/14/20 124/64   07/24/20 111/60   01/24/20 119/67       NPO Status: Time of last liquid consumption: 2100                        Time of last solid consumption: 2100                        Date of last liquid consumption: 08/13/20                        Date of last solid food consumption: 08/13/20    BMI:   Wt Readings from Last 3 Encounters:   08/14/20 196 lb (88.9 kg)   07/24/20 197 lb (89.4 kg)   01/24/20 249 lb (112.9 kg)     Body mass index is 26.58 kg/m².     CBC:   Lab Results   Component Value Date    WBC 7.8 07/14/2020    RBC 4.91 07/14/2020    HGB 14.6 07/14/2020    HCT 43.2 07/14/2020    MCV 88.0 07/14/2020    RDW 13.9 07/14/2020     07/14/2020       CMP:   Lab Results   Component Value Date     07/14/2020    K 4.4 07/14/2020    K 4.4 07/24/2019     07/14/2020    CO2 28 07/14/2020    BUN 18 07/14/2020    CREATININE 1.0 07/14/2020    GFRAA >60 07/14/2020    LABGLOM >60 07/14/2020    GLUCOSE 96 07/14/2020    PROT 7.6 07/14/2020    CALCIUM 9.8 07/14/2020    BILITOT 0.9 07/14/2020 ALKPHOS 113 07/14/2020    AST 16 07/14/2020    ALT 25 07/14/2020       POC Tests: No results for input(s): POCGLU, POCNA, POCK, POCCL, POCBUN, POCHEMO, POCHCT in the last 72 hours. Coags:   Lab Results   Component Value Date    PROTIME 11.4 12/31/2014    INR 1.1 12/31/2014    APTT 33.4 12/31/2014       HCG (If Applicable): No results found for: PREGTESTUR, PREGSERUM, HCG, HCGQUANT     ABGs: No results found for: PHART, PO2ART, RRV5VML, YJE4PUZ, BEART, I4BJLVLK     Type & Screen (If Applicable):  No results found for: LABABO, LABRH    Drug/Infectious Status (If Applicable):  No results found for: HIV, HEPCAB    COVID-19 Screening (If Applicable):   Lab Results   Component Value Date    COVID19 Not Detected 08/10/2020         Anesthesia Evaluation  Patient summary reviewed  Airway: Mallampati: II  TM distance: >3 FB   Neck ROM: full  Mouth opening: > = 3 FB Dental: normal exam         Pulmonary:Negative Pulmonary ROS breath sounds clear to auscultation                             Cardiovascular:Negative CV ROS            Rhythm: regular  Rate: normal                    Neuro/Psych:   Negative Neuro/Psych ROS              GI/Hepatic/Renal:   (+) hiatal hernia, liver disease:,           Endo/Other: Negative Endo/Other ROS                    Abdominal:       Abdomen: soft. Vascular:                                        Anesthesia Plan      general     ASA 2       Induction: intravenous. Anesthetic plan and risks discussed with patient. Plan discussed with CRNA.                   Jaciel Ba MD   8/14/2020

## 2020-08-14 NOTE — H&P
on file     Gets together: Not on file     Attends [employer] service: Not on file     Active member of club or organization: Not on file     Attends meetings of clubs or organizations: Not on file     Relationship status: Not on file    Intimate partner violence     Fear of current or ex partner: Not on file     Emotionally abused: Not on file     Physically abused: Not on file     Forced sexual activity: Not on file   Other Topics Concern    Not on file   Social History Narrative    Not on file       A complete 10 system review was performed and are otherwise negative unless mentioned in the above HPI. Specific negatives are listed below but may not include all those reviewed.     General ROS: negative obtundation, AMS  ENT ROS: negative rhinorrhea, epistaxis  Allergy and Immunology ROS: negative itchy/watery eyes or nasal congestion  Hematological and Lymphatic ROS: negative spontaneous bleeding or bruising  Endocrine ROS: negative  lethargy, mood swings, palpitations or polydipsia/polyuria  Respiratory ROS: negative sputum changes, stridor, tachypnea or wheezing  Cardiovascular ROS: negative for - loss of consciousness, murmur or orthopnea  Gastrointestinal ROS: negative for - hematochezia or hematemesis  Genito-Urinary ROS: negative for -  genital discharge or hematuria  Musculoskeletal ROS: negative for - focal weakness, gangrene  Psych/Neuro ROS: negative for - visual or auditory hallucinations, suicidal ideation        Physical exam:   /64   Pulse 54   Temp 98.8 °F (37.1 °C) (Temporal)   Resp 16   Ht 6' (1.829 m)   Wt 196 lb (88.9 kg)   SpO2 100%   BMI 26.58 kg/m²    General appearance: alert, appears stated age and cooperative  Head: Normocephalic, without obvious abnormality, atraumatic  Neck: no adenopathy, supple, symmetrical, trachea midline and thyroid not enlarged, symmetric, no tenderness/mass/nodules  Lungs: clear to auscultation bilaterally  Heart: regular rate and rhythm  Abdomen: soft, non-tender; bowel sounds normal; no masses,  no organomegaly  Extremities: extremities normal, atraumatic, no cyanosis or edema    Assessment: Post Keyshawn-en- Y Gastric Bypass. He does not complain of GERD,  does not have sleep apnea,  does not have diabetes,  does not have hypertension off medical treatment. Cholesterol and triglycerides are normal. LFT elevation persissts with RUQ pain and right flank pain    Plan:  RUQ US showed gallstones  OR for lap luis daniel with IOC    Discussed at length the risks of proceeding with surgery during COVID pandemic. Discussed number of cases in [address] [facility] and [place] and measures being taken to ensure patient safety. Discussed there is risk of exposure in the hospital setting and proceeding with elective surgery. Due to clinically worsening with pain and compromised daily activities at this time we agree benefits outweigh the risks at this point.       Physician Signature: Electronically signed by Dr. Ceci Menendez

## 2020-08-28 ENCOUNTER — OFFICE VISIT (OUTPATIENT)
Dept: BARIATRICS/WEIGHT MGMT | Age: 35
End: 2020-08-28
Payer: COMMERCIAL

## 2020-08-28 VITALS
HEIGHT: 72 IN | TEMPERATURE: 97.8 F | WEIGHT: 196 LBS | RESPIRATION RATE: 20 BRPM | BODY MASS INDEX: 26.55 KG/M2 | SYSTOLIC BLOOD PRESSURE: 105 MMHG | DIASTOLIC BLOOD PRESSURE: 62 MMHG | HEART RATE: 56 BPM

## 2020-08-28 PROCEDURE — 99211 OFF/OP EST MAY X REQ PHY/QHP: CPT

## 2020-08-28 PROCEDURE — 99024 POSTOP FOLLOW-UP VISIT: CPT | Performed by: SURGERY

## 2020-08-28 NOTE — PROGRESS NOTES
General Surgery Office Note  Surgical weight loss  Mariangel Diaz MD, MS    Patient's Name/Date of Birth: Leeanna Hernandez / 1985    Date: August 28, 2020     Chief compaint: Postop visit from laparoscopic cholecystectomy    Surgeon: Gayle Peoples MD    Patient Active Problem List   Diagnosis    Morbid obesity with BMI of 50.0-59.9, adult ([facility]    Fatty liver    Hiatal hernia    S/P partial gastrectomy    Morbid obesity due to excess calories ([facility]       Subjective: Doing well, no new complaints, pain prior to surgery has resolved, tolerating diet, bowel function normal, anxious to return to normal physical activity    Objective:  /62 (Site: Left Upper Arm, Position: Sitting, Cuff Size: Medium Adult)   Pulse 56   Temp 97.8 °F (36.6 °C) (Temporal)   Resp 20   Ht 6' (1.829 m)   Wt 196 lb (88.9 kg)   BMI 26.58 kg/m²   Labs:  No results for input(s): WBC, HGB, HCT in the last 72 hours. Invalid input(s): PLR  Lab Results   Component Value Date    CREATININE 1.0 07/14/2020    BUN 18 07/14/2020     07/14/2020    K 4.4 07/14/2020     07/14/2020    CO2 28 07/14/2020     No results for input(s): LIPASE, AMYLASE in the last 72 hours. General appearance: AA, NAD  HEENT: NCAT, PERRLA, EOMI  Lungs: Clear, equal rise bilateral  Heart: Reg  Abdomen: soft, nondistended, nontender, incisions well healed, no signs of infection, no cellulitis, no hematoma      Pathology: Chronic cholecystitis, cholelithaisis    Assessment/Plan:  Leeanna Hernandez is a 30 y.o. male 2 weeks s/p laparoscopic cholecystectomy. Doing well.     Resume activity gradually   No heavy lifting more than 20lbs for 4 weeks total  Pathology reviewed and copy provided  Follow up as needed    Physician Signature: Electronically signed by Dr. Gayle Peoples  [phone] (p)  8/28/2020  8:25 AM

## 2020-08-28 NOTE — PROGRESS NOTES
Pt is here for 2 week post op lap luis daniel. Pt states the incisions are healing well. Opioid Questions for BSTOP:  1. Enter the total number of opiods used after discharge  3  2. Did patient return unused opiods to a verified prescription disposal location?  No

## 2021-01-19 ENCOUNTER — HOSPITAL ENCOUNTER (OUTPATIENT)
Age: 36
Discharge: HOME | End: 2021-01-19
Payer: COMMERCIAL

## 2021-01-19 DIAGNOSIS — K91.2 MALNUTRITION FOLLOWING GASTROINTESTINAL SURGERY: ICD-10-CM

## 2021-01-19 LAB
ALBUMIN SERPL-MCNC: 4.1 G/DL (ref 3.5–5.2)
ALP BLD-CCNC: 106 U/L (ref 40–129)
ALT SERPL-CCNC: 26 U/L (ref 0–40)
ANION GAP SERPL CALCULATED.3IONS-SCNC: 12 MMOL/L (ref 7–16)
AST SERPL-CCNC: 23 U/L (ref 0–39)
BILIRUB SERPL-MCNC: 1.1 MG/DL (ref 0–1.2)
BUN BLDV-MCNC: 17 MG/DL (ref 6–20)
CALCIUM SERPL-MCNC: 9.4 MG/DL (ref 8.6–10.2)
CHLORIDE BLD-SCNC: 100 MMOL/L (ref 98–107)
CHOLESTEROL, TOTAL: 135 MG/DL (ref 0–199)
CO2: 29 MMOL/L (ref 22–29)
CREAT SERPL-MCNC: 1 MG/DL (ref 0.7–1.2)
FERRITIN: 121 NG/ML
FOLATE: 19.6 NG/ML (ref 4.8–24.2)
GFR AFRICAN AMERICAN: >60
GFR NON-AFRICAN AMERICAN: >60 ML/MIN/1.73
GLUCOSE BLD-MCNC: 98 MG/DL (ref 74–99)
HCT VFR BLD CALC: 44 % (ref 37–54)
HEMOGLOBIN: 14.6 G/DL (ref 12.5–16.5)
MCH RBC QN AUTO: 29.9 PG (ref 26–35)
MCHC RBC AUTO-ENTMCNC: 33.2 % (ref 32–34.5)
MCV RBC AUTO: 90 FL (ref 80–99.9)
PDW BLD-RTO: 12.9 FL (ref 11.5–15)
PLATELET # BLD: 220 E9/L (ref 130–450)
PMV BLD AUTO: 9.9 FL (ref 7–12)
POTASSIUM SERPL-SCNC: 4.5 MMOL/L (ref 3.5–5)
PREALBUMIN: 18 MG/DL (ref 20–40)
RBC # BLD: 4.89 E12/L (ref 3.8–5.8)
SODIUM BLD-SCNC: 141 MMOL/L (ref 132–146)
TOTAL PROTEIN: 7.4 G/DL (ref 6.4–8.3)
TRIGL SERPL-MCNC: 76 MG/DL (ref 0–149)
VITAMIN B-12: 1043 PG/ML (ref 211–946)
VITAMIN D 25-HYDROXY: 86 NG/ML (ref 30–100)
WBC # BLD: 5.2 E9/L (ref 4.5–11.5)

## 2021-01-19 PROCEDURE — 84255 ASSAY OF SELENIUM: CPT

## 2021-01-19 PROCEDURE — 85027 COMPLETE CBC AUTOMATED: CPT

## 2021-01-19 PROCEDURE — 84425 ASSAY OF VITAMIN B-1: CPT

## 2021-01-19 PROCEDURE — 82746 ASSAY OF FOLIC ACID SERUM: CPT

## 2021-01-19 PROCEDURE — 82306 VITAMIN D 25 HYDROXY: CPT

## 2021-01-19 PROCEDURE — 84478 ASSAY OF TRIGLYCERIDES: CPT

## 2021-01-19 PROCEDURE — 82465 ASSAY BLD/SERUM CHOLESTEROL: CPT

## 2021-01-19 PROCEDURE — 84134 ASSAY OF PREALBUMIN: CPT

## 2021-01-19 PROCEDURE — 84630 ASSAY OF ZINC: CPT

## 2021-01-19 PROCEDURE — 82607 VITAMIN B-12: CPT

## 2021-01-19 PROCEDURE — 80053 COMPREHEN METABOLIC PANEL: CPT

## 2021-01-19 PROCEDURE — 82525 ASSAY OF COPPER: CPT

## 2021-01-19 PROCEDURE — 82728 ASSAY OF FERRITIN: CPT

## 2021-01-19 PROCEDURE — 36415 COLL VENOUS BLD VENIPUNCTURE: CPT

## 2021-01-21 LAB
COPPER: 103.4 UG/DL (ref 70–140)
ZINC: 67.2 UG/DL (ref 60–120)

## 2021-01-22 ENCOUNTER — OFFICE VISIT (OUTPATIENT)
Dept: BARIATRICS/WEIGHT MGMT | Age: 36
End: 2021-01-22
Payer: COMMERCIAL

## 2021-01-22 VITALS
WEIGHT: 196 LBS | RESPIRATION RATE: 20 BRPM | SYSTOLIC BLOOD PRESSURE: 118 MMHG | DIASTOLIC BLOOD PRESSURE: 66 MMHG | HEART RATE: 67 BPM | HEIGHT: 72 IN | TEMPERATURE: 97.1 F | BODY MASS INDEX: 26.55 KG/M2

## 2021-01-22 DIAGNOSIS — K91.2 MALNUTRITION FOLLOWING GASTROINTESTINAL SURGERY: Primary | ICD-10-CM

## 2021-01-22 LAB — SELENIUM: 190.2 UG/L (ref 23–190)

## 2021-01-22 PROCEDURE — 99211 OFF/OP EST MAY X REQ PHY/QHP: CPT

## 2021-01-22 PROCEDURE — 99213 OFFICE O/P EST LOW 20 MIN: CPT | Performed by: SURGERY

## 2021-01-22 NOTE — PROGRESS NOTES
Lexi Ken  1/22/2021  [address]     Chaz-en- Y Gastric Bypass  1.5 Year Post-Operative Follow-up     Lexi Ken is a 37 y.o. male who is 1.5 years post Laparoscopic Chaz-en-Y Gastric Bypass surgery. Reports RUQ pain. He is not having swallowing difficulty, is compliant most of the time with the multivitamins and calcium + Vit D. He is meeting fluid recommendations of at least 64 ounces per day and is meeting protein recommendations. He  is exercising: running 30 minutes/day- 5 days/week and strength training 30 minutes/day- 5 days/week. Weight=196 lb (88.9 kg)  Today's weight represents a total weight loss of 178 pounds since surgery with 0 pounds regained since the lowest weight. Prior to Admission medications    Medication Sig Start Date End Date Taking? Authorizing Provider   IRON PO Take 29 mg by mouth daily   Yes Historical Provider, MD   Calcium Carbonate (CALCI-CHEW PO) Take 3 tablets by mouth daily Indications: Bar Adv   Yes Historical Provider, MD   Multiple Vitamin (MULTI-VITAMIN DAILY PO) Take 2 tablets by mouth daily Indications:  Bar Adv    Yes Historical Provider, MD   Cholecalciferol (VITAMIN D3) 5000 units CAPS Take one capsule every day 5/24/19  Yes Elizabeth Becker MD        No Known Allergies        Past Medical History:   Diagnosis Date    Anesthesia complication     stopped breathing with wisdom teeth removal    Morbid obesity due to excess calories [facility])        Past Surgical History:   Procedure Laterality Date    ADENOIDECTOMY      CHOLECYSTECTOMY, LAPAROSCOPIC N/A 8/14/2020    CHOLECYSTECTOMY LAPAROSCOPIC performed by Ana Velazquez MD at [address] [place] [address], [facility]  03/05/2018    with biopsy    FRACTURE SURGERY      broken L arm x2 and L leg    CHAZ-EN-Y GASTRIC BYPASS N/A 7/23/2019    GASTRIC BYPASS CHAZ-EN-Y LAPAROSCOPIC ROBOTIC XI performed by Ana Velazquez MD at [address] [place] [address]  2016  WISDOM TOOTH EXTRACTION  2011       Current Outpatient Medications   Medication Sig Dispense Refill    IRON PO Take 29 mg by mouth daily      Calcium Carbonate (CALCI-CHEW PO) Take 3 tablets by mouth daily Indications: Bar Adv      Multiple Vitamin (MULTI-VITAMIN DAILY PO) Take 2 tablets by mouth daily Indications: Bar Adv       Cholecalciferol (VITAMIN D3) 5000 units CAPS Take one capsule every day 180 capsule 1     No current facility-administered medications for this visit. No Known Allergies    No family history on file.     Social History     Socioeconomic History    Marital status:      Spouse name: Not on file    Number of children: Not on file    Years of education: Not on file    Highest education level: Not on file   Occupational History    Not on file   Social Needs    Financial resource strain: Not on file    Food insecurity     Worry: Not on file     Inability: Not on file    Transportation needs     Medical: Not on file     Non-medical: Not on file   Tobacco Use    Smoking status: Never Smoker    Smokeless tobacco: Never Used   Substance and Sexual Activity    Alcohol use: No    Drug use: No    Sexual activity: Yes   Lifestyle    Physical activity     Days per week: Not on file     Minutes per session: Not on file    Stress: Not on file   Relationships    Social connections     Talks on phone: Not on file     Gets together: Not on file     Attends [employer] service: Not on file     Active member of club or organization: Not on file     Attends meetings of clubs or organizations: Not on file     Relationship status: Not on file    Intimate partner violence     Fear of current or ex partner: Not on file     Emotionally abused: Not on file     Physically abused: Not on file     Forced sexual activity: Not on file   Other Topics Concern    Not on file   Social History Narrative    Not on file A complete 10 system review was performed and are otherwise negative unless mentioned in the above HPI. Specific negatives are listed below but may not include all those reviewed. General ROS: negative obtundation, AMS  ENT ROS: negative rhinorrhea, epistaxis  Allergy and Immunology ROS: negative itchy/watery eyes or nasal congestion  Hematological and Lymphatic ROS: negative spontaneous bleeding or bruising  Endocrine ROS: negative  lethargy, mood swings, palpitations or polydipsia/polyuria  Respiratory ROS: negative sputum changes, stridor, tachypnea or wheezing  Cardiovascular ROS: negative for - loss of consciousness, murmur or orthopnea  Gastrointestinal ROS: negative for - hematochezia or hematemesis  Genito-Urinary ROS: negative for -  genital discharge or hematuria  Musculoskeletal ROS: negative for - focal weakness, gangrene  Psych/Neuro ROS: negative for - visual or auditory hallucinations, suicidal ideation        Physical exam:   /66 (Site: Left Upper Arm, Position: Sitting, Cuff Size: Large Adult)   Pulse 67   Temp 97.1 °F (36.2 °C) (Temporal)   Resp 20   Ht 6' (1.829 m)   Wt 196 lb (88.9 kg)   BMI 26.58 kg/m²    General appearance: alert, appears stated age and cooperative  Head: Normocephalic, without obvious abnormality, atraumatic  Neck: no adenopathy, supple, symmetrical, trachea midline and thyroid not enlarged, symmetric, no tenderness/mass/nodules  Lungs: clear to auscultation bilaterally  Heart: regular rate and rhythm  Abdomen: soft, non-tender; bowel sounds normal; no masses,  no organomegaly  Extremities: extremities normal, atraumatic, no cyanosis or edema    Assessment: Post Keyshawn-en- Y Gastric Bypass. He does not complain of GERD,  does not have sleep apnea,  does not have diabetes,  does not have hypertension off medical treatment.  Cholesterol and triglycerides are normal. LFT now normal after GB removal. No issues    Plan: Continue to eat a high protein, low calorie diet, eat small portions very slowly and chew well before swallowing. Drink plenty of water and fluids. Make sure to use fiber to keep the bowels regular. Try to exercise 7 days per week, maintain adequate variety and balance. Always notify the clinic if you have any medical problems. Follow up in 6 months.       Physician Signature: Electronically signed by Dr. Demetrio Bowen MD

## 2021-01-22 NOTE — PATIENT INSTRUCTIONS
Please continue to take your vitamin and mineral supplements as instructed. If you received a blood work prescription today for laboratory monitoring due prior to your next routine follow-up visit, please have this blood work obtained 10 to 14 days prior to your next visit. It is important to fast for 12 hours prior to routine weight loss surgery blood work, EXCEPT for drinking water, to ensure accuracy of results. Please report nausea, vomiting, abdominal pain, or any other problems you experience to your surgeon. For problems related to weight loss surgery, it is best to go to [address] [place] [address] Emergency Department and have your surgeon paged.

## 2021-01-23 LAB — VITAMIN B1 WHOLE BLOOD: 150 NMOL/L (ref 70–180)

## 2021-03-26 ENCOUNTER — IMMUNIZATION (OUTPATIENT)
Dept: PRIMARY CARE CLINIC | Age: 36
End: 2021-03-26
Payer: COMMERCIAL

## 2021-03-26 PROCEDURE — 91300 COVID-19, PFIZER VACCINE 30MCG/0.3ML DOSE: CPT | Performed by: FAMILY MEDICINE

## 2021-03-26 PROCEDURE — 0001A COVID-19, PFIZER VACCINE 30MCG/0.3ML DOSE: CPT | Performed by: FAMILY MEDICINE

## 2021-04-26 ENCOUNTER — IMMUNIZATION (OUTPATIENT)
Dept: PRIMARY CARE CLINIC | Age: 36
End: 2021-04-26
Payer: COMMERCIAL

## 2021-04-26 PROCEDURE — 91300 COVID-19, PFIZER VACCINE 30MCG/0.3ML DOSE: CPT | Performed by: INTERNAL MEDICINE

## 2021-04-26 PROCEDURE — 0002A COVID-19, PFIZER VACCINE 30MCG/0.3ML DOSE: CPT | Performed by: INTERNAL MEDICINE

## 2021-07-14 ENCOUNTER — HOSPITAL ENCOUNTER (OUTPATIENT)
Age: 36
Discharge: HOME | End: 2021-07-14
Payer: COMMERCIAL

## 2021-07-14 LAB
ALBUMIN SERPL-MCNC: 4 G/DL (ref 3.5–5.2)
ALP BLD-CCNC: 97 U/L (ref 40–129)
ALT SERPL-CCNC: 24 U/L (ref 0–40)
ANION GAP SERPL CALCULATED.3IONS-SCNC: 9 MMOL/L (ref 7–16)
AST SERPL-CCNC: 21 U/L (ref 0–39)
BILIRUB SERPL-MCNC: 1.1 MG/DL (ref 0–1.2)
BUN BLDV-MCNC: 18 MG/DL (ref 6–20)
CALCIUM SERPL-MCNC: 9.7 MG/DL (ref 8.6–10.2)
CHLORIDE BLD-SCNC: 101 MMOL/L (ref 98–107)
CHOLESTEROL, TOTAL: 142 MG/DL (ref 0–199)
CO2: 30 MMOL/L (ref 22–29)
CREAT SERPL-MCNC: 1 MG/DL (ref 0.7–1.2)
FERRITIN: 119 NG/ML
FOLATE: 16.4 NG/ML (ref 4.8–24.2)
GFR AFRICAN AMERICAN: >60
GFR NON-AFRICAN AMERICAN: >60 ML/MIN/1.73
GLUCOSE BLD-MCNC: 103 MG/DL (ref 74–99)
HCT VFR BLD CALC: 45.5 % (ref 37–54)
HDLC SERPL-MCNC: 53 MG/DL
HEMOGLOBIN: 14.9 G/DL (ref 12.5–16.5)
LDL CHOLESTEROL CALCULATED: 73 MG/DL (ref 0–99)
MCH RBC QN AUTO: 29.3 PG (ref 26–35)
MCHC RBC AUTO-ENTMCNC: 32.7 % (ref 32–34.5)
MCV RBC AUTO: 89.6 FL (ref 80–99.9)
PDW BLD-RTO: 12.8 FL (ref 11.5–15)
PLATELET # BLD: 210 E9/L (ref 130–450)
PMV BLD AUTO: 10.2 FL (ref 7–12)
POTASSIUM SERPL-SCNC: 5.2 MMOL/L (ref 3.5–5)
PREALBUMIN: 17 MG/DL (ref 20–40)
RBC # BLD: 5.08 E12/L (ref 3.8–5.8)
SODIUM BLD-SCNC: 140 MMOL/L (ref 132–146)
TOTAL PROTEIN: 7.3 G/DL (ref 6.4–8.3)
TRIGL SERPL-MCNC: 78 MG/DL (ref 0–149)
VITAMIN B-12: 1045 PG/ML (ref 211–946)
VLDLC SERPL CALC-MCNC: 16 MG/DL
WBC # BLD: 5.2 E9/L (ref 4.5–11.5)

## 2021-07-14 PROCEDURE — 82306 VITAMIN D 25 HYDROXY: CPT

## 2021-07-14 PROCEDURE — 36415 COLL VENOUS BLD VENIPUNCTURE: CPT

## 2021-07-14 PROCEDURE — 82746 ASSAY OF FOLIC ACID SERUM: CPT

## 2021-07-14 PROCEDURE — 85027 COMPLETE CBC AUTOMATED: CPT

## 2021-07-14 PROCEDURE — 84134 ASSAY OF PREALBUMIN: CPT

## 2021-07-14 PROCEDURE — 84255 ASSAY OF SELENIUM: CPT

## 2021-07-14 PROCEDURE — 82607 VITAMIN B-12: CPT

## 2021-07-14 PROCEDURE — 82525 ASSAY OF COPPER: CPT

## 2021-07-14 PROCEDURE — 84630 ASSAY OF ZINC: CPT

## 2021-07-14 PROCEDURE — 80061 LIPID PANEL: CPT

## 2021-07-14 PROCEDURE — 82728 ASSAY OF FERRITIN: CPT

## 2021-07-14 PROCEDURE — 84425 ASSAY OF VITAMIN B-1: CPT

## 2021-07-14 PROCEDURE — 80053 COMPREHEN METABOLIC PANEL: CPT

## 2021-07-15 LAB — VITAMIN D 25-HYDROXY: 100 NG/ML (ref 30–100)

## 2021-07-18 LAB
COPPER: 100.3 UG/DL (ref 70–140)
SELENIUM: 167.1 UG/L (ref 23–190)
ZINC: 71.8 UG/DL (ref 60–120)

## 2021-07-21 LAB — VITAMIN B1 WHOLE BLOOD: 192 NMOL/L (ref 70–180)

## 2021-07-23 ENCOUNTER — OFFICE VISIT (OUTPATIENT)
Dept: BARIATRICS/WEIGHT MGMT | Age: 36
End: 2021-07-23
Payer: COMMERCIAL

## 2021-07-23 VITALS
HEIGHT: 72 IN | SYSTOLIC BLOOD PRESSURE: 133 MMHG | DIASTOLIC BLOOD PRESSURE: 76 MMHG | TEMPERATURE: 97.3 F | RESPIRATION RATE: 20 BRPM | WEIGHT: 193 LBS | BODY MASS INDEX: 26.14 KG/M2 | HEART RATE: 54 BPM

## 2021-07-23 DIAGNOSIS — K91.2 MALNUTRITION FOLLOWING GASTROINTESTINAL SURGERY: Primary | ICD-10-CM

## 2021-07-23 PROCEDURE — 99211 OFF/OP EST MAY X REQ PHY/QHP: CPT

## 2021-07-23 PROCEDURE — 99213 OFFICE O/P EST LOW 20 MIN: CPT | Performed by: SURGERY

## 2021-07-23 NOTE — PATIENT INSTRUCTIONS
Please continue to take your vitamin and mineral supplements as instructed. If you received a blood work prescription today for laboratory monitoring due prior to your next routine follow-up visit, please have this blood work obtained 10 to 14 days prior to your next visit. It is important to fast for 12 hours prior to routine weight loss surgery blood work, EXCEPT for drinking water, to ensure accuracy of results. Please report nausea, vomiting, abdominal pain, or any other problems you experience to your surgeon. For problems related to weight loss surgery, it is best to go to [address] [place] [address] Emergency Department and have your surgeon paged.

## 2021-07-23 NOTE — PROGRESS NOTES
Winnie Ramos  7/23/2021  [address]     Chaz-en- Y Gastric Bypass  2 Year Post-Operative Follow-up     Winnie Ramos is a 37 y.o. male who is 2 years post Laparoscopic Chza-en-Y Gastric Bypass surgery. Reports no issues. He is not having swallowing difficulty, is compliant most of the time with the multivitamins and calcium + Vit D. He is meeting fluid recommendations of at least 64 ounces per day and is meeting protein recommendations. He  is exercising: cardiovascular equipment 30 minutes/day- 3 days/week and strength training 30 minutes/day- 3 days/week. Weight=193 lb (87.5 kg)  Today's weight represents a total weight loss of 179 pounds since surgery with 0 pounds regained since the lowest weight. Prior to Admission medications    Medication Sig Start Date End Date Taking? Authorizing Provider   IRON PO Take 29 mg by mouth daily   Yes Historical Provider, MD   Calcium Carbonate (CALCI-CHEW PO) Take 3 tablets by mouth daily Indications: Bar Adv   Yes Historical Provider, MD   Multiple Vitamin (MULTI-VITAMIN DAILY PO) Take 2 tablets by mouth daily Indications:  Bar Adv    Yes Historical Provider, MD   Cholecalciferol (VITAMIN D3) 5000 units CAPS Take one capsule every day 5/24/19  Yes Manisha Callahan MD        No Known Allergies        Past Medical History:   Diagnosis Date    Anesthesia complication     stopped breathing with wisdom teeth removal    Morbid obesity due to excess calories [facility])        Past Surgical History:   Procedure Laterality Date    ADENOIDECTOMY      CHOLECYSTECTOMY, LAPAROSCOPIC N/A 8/14/2020    CHOLECYSTECTOMY LAPAROSCOPIC performed by John Li MD at [facility] [place], DIAGNOSTIC  03/05/2018    with biopsy    FRACTURE SURGERY      broken L arm x2 and L leg    CHAZ-EN-Y GASTRIC BYPASS N/A 7/23/2019    GASTRIC BYPASS CHAZ-EN-Y LAPAROSCOPIC ROBOTIC XI performed by John Li MD at [address] [place] [address]  2016    WISDOM TOOTH EXTRACTION  2011       Current Outpatient Medications   Medication Sig Dispense Refill    IRON PO Take 29 mg by mouth daily      Calcium Carbonate (CALCI-CHEW PO) Take 3 tablets by mouth daily Indications: Bar Adv      Multiple Vitamin (MULTI-VITAMIN DAILY PO) Take 2 tablets by mouth daily Indications: Bar Adv       Cholecalciferol (VITAMIN D3) 5000 units CAPS Take one capsule every day 180 capsule 1     No current facility-administered medications for this visit. No Known Allergies    History reviewed. No pertinent family history. Social History     Socioeconomic History    Marital status:      Spouse name: Not on file    Number of children: Not on file    Years of education: Not on file    Highest education level: Not on file   Occupational History    Not on file   Tobacco Use    Smoking status: Never Smoker    Smokeless tobacco: Never Used   Vaping Use    Vaping Use: Never used   Substance and Sexual Activity    Alcohol use: No    Drug use: No    Sexual activity: Yes   Other Topics Concern    Not on file   Social History Narrative    Not on file     Social Determinants of Health     Financial Resource Strain:     Difficulty of Paying Living Expenses:    Food Insecurity:     Worried About Running Out of Food in the Last Year:     [address] in the Last Year:    Transportation Needs:     Lack of Transportation (Medical):      Lack of Transportation (Non-Medical):    Physical Activity:     Days of Exercise per Week:     Minutes of Exercise per Session:    Stress:     Feeling of Stress :    Social Connections:     Frequency of Communication with Friends and Family:     Frequency of Social Gatherings with Friends and Family:     Attends [employer] Services:     Active Member of Clubs or Organizations:     Attends Club or Organization Meetings:     Marital Status:    Intimate Partner Violence:     Fear of Current or Ex-Partner:     Emotionally Abused:     Physically Abused:     Sexually Abused:        A complete 10 system review was performed and are otherwise negative unless mentioned in the above HPI. Specific negatives are listed below but may not include all those reviewed. General ROS: negative obtundation, AMS  ENT ROS: negative rhinorrhea, epistaxis  Allergy and Immunology ROS: negative itchy/watery eyes or nasal congestion  Hematological and Lymphatic ROS: negative spontaneous bleeding or bruising  Endocrine ROS: negative  lethargy, mood swings, palpitations or polydipsia/polyuria  Respiratory ROS: negative sputum changes, stridor, tachypnea or wheezing  Cardiovascular ROS: negative for - loss of consciousness, murmur or orthopnea  Gastrointestinal ROS: negative for - hematochezia or hematemesis  Genito-Urinary ROS: negative for -  genital discharge or hematuria  Musculoskeletal ROS: negative for - focal weakness, gangrene  Psych/Neuro ROS: negative for - visual or auditory hallucinations, suicidal ideation        Physical exam:   /76 (Site: Left Lower Arm, Position: Sitting, Cuff Size: Medium Adult)   Pulse 54   Temp 97.3 °F (36.3 °C) (Temporal)   Resp 20   Ht 6' (1.829 m)   Wt 193 lb (87.5 kg)   BMI 26.18 kg/m²    General appearance: alert, appears stated age and cooperative  Head: Normocephalic, without obvious abnormality, atraumatic  Neck: no adenopathy, supple, symmetrical, trachea midline and thyroid not enlarged, symmetric, no tenderness/mass/nodules  Lungs: clear to auscultation bilaterally  Heart: regular rate and rhythm  Abdomen: soft, non-tender; bowel sounds normal; no masses,  no organomegaly  Extremities: extremities normal, atraumatic, no cyanosis or edema    Assessment: Post Keyshawn-en- Y Gastric Bypass. He does not complain of GERD,  does not have sleep apnea,  does not have diabetes,  does not have hypertension off medical treatment. Cholesterol and triglycerides are normal.    Plan:  Doing well.  Continue to eat a high protein, low calorie diet, eat small portions very slowly and chew well before swallowing. Drink plenty of water and fluids. Make sure to use fiber to keep the bowels regular. Try to exercise 7 days per week, maintain adequate variety and balance. Always notify the clinic if you have any medical problems. Follow up in 6 months.       Physician Signature: Electronically signed by Dr. Milton Llanes MD

## 2021-07-23 NOTE — PROGRESS NOTES
2 yr LRYGB f/u, labs are in [address] Water intake is 90 oz daily, having bowel movements, vitamin intake is good and getting protein through shakes and foods.

## 2022-01-24 ENCOUNTER — TELEPHONE (OUTPATIENT)
Dept: BARIATRICS/WEIGHT MGMT | Age: 37
End: 2022-01-24

## 2022-01-24 DIAGNOSIS — T85.898A ANASTOMOTIC ULCER S/P GASTRIC BYPASS: Primary | ICD-10-CM

## 2022-01-24 DIAGNOSIS — K28.9 ANASTOMOTIC ULCER S/P GASTRIC BYPASS: Primary | ICD-10-CM

## 2022-01-24 RX ORDER — OMEPRAZOLE 20 MG/1
20 CAPSULE, DELAYED RELEASE ORAL DAILY
Qty: 90 CAPSULE | Refills: 0 | Status: SHIPPED | OUTPATIENT
Start: 2022-01-24 | End: 2023-01-24

## 2022-01-24 NOTE — TELEPHONE ENCOUNTER
Patient called in and he has been having pain in pouch area since Friday. He ate roast beef and since then feels it. He also shoveled a lot of snow and is concerned he may of developed a hernia. Per order of Dr. Anders Platt patient started back on his  Omeprazole and appointment set for next Friday with Dr. Anders Platt. He will go on a more bland diet and supplement with protein drinks. He will call if S/S worsen.

## 2022-01-31 ENCOUNTER — TELEPHONE (OUTPATIENT)
Dept: BARIATRICS/WEIGHT MGMT | Age: 37
End: 2022-01-31

## 2022-01-31 NOTE — TELEPHONE ENCOUNTER
Patient called in and is feeling much better. Wants to cancel appointment with Drea Sneed for Friday. Appointment cancelled. He will recall as needed.

## 2022-08-15 ENCOUNTER — TELEPHONE (OUTPATIENT)
Dept: BARIATRICS/WEIGHT MGMT | Age: 37
End: 2022-08-15

## 2022-08-15 NOTE — TELEPHONE ENCOUNTER
I left a detailed message on the patient's voicemail informing the patient to have their labs drawn before their appointment.     Moved patient to NP.

## 2022-10-14 ENCOUNTER — HOSPITAL ENCOUNTER (OUTPATIENT)
Age: 37
Discharge: HOME | End: 2022-10-14
Payer: COMMERCIAL

## 2022-10-14 LAB
ALBUMIN SERPL-MCNC: 3.9 G/DL (ref 3.5–5.2)
ALP BLD-CCNC: 96 U/L (ref 40–129)
ALT SERPL-CCNC: 14 U/L (ref 0–40)
ANION GAP SERPL CALCULATED.3IONS-SCNC: 9 MMOL/L (ref 7–16)
AST SERPL-CCNC: 24 U/L (ref 0–39)
BILIRUB SERPL-MCNC: 0.9 MG/DL (ref 0–1.2)
BUN BLDV-MCNC: 19 MG/DL (ref 6–20)
CALCIUM SERPL-MCNC: 9.5 MG/DL (ref 8.6–10.2)
CHLORIDE BLD-SCNC: 103 MMOL/L (ref 98–107)
CHOLESTEROL, TOTAL: 139 MG/DL (ref 0–199)
CO2: 28 MMOL/L (ref 22–29)
CREAT SERPL-MCNC: 1 MG/DL (ref 0.7–1.2)
FERRITIN: 85 NG/ML
FOLATE: 13.7 NG/ML (ref 4.8–24.2)
GFR AFRICAN AMERICAN: >60
GFR SERPL CREATININE-BSD FRML MDRD: 85 ML/MIN/1.73
GLUCOSE BLD-MCNC: 96 MG/DL (ref 74–99)
HCT VFR BLD CALC: 44.1 % (ref 37–54)
HEMOGLOBIN: 14.7 G/DL (ref 12.5–16.5)
MCH RBC QN AUTO: 29.6 PG (ref 26–35)
MCHC RBC AUTO-ENTMCNC: 33.3 % (ref 32–34.5)
MCV RBC AUTO: 88.9 FL (ref 80–99.9)
PDW BLD-RTO: 13.2 FL (ref 11.5–15)
PLATELET # BLD: 230 E9/L (ref 130–450)
PMV BLD AUTO: 10.3 FL (ref 7–12)
POTASSIUM SERPL-SCNC: 4.7 MMOL/L (ref 3.5–5)
PREALBUMIN: 14 MG/DL (ref 20–40)
RBC # BLD: 4.96 E12/L (ref 3.8–5.8)
SODIUM BLD-SCNC: 140 MMOL/L (ref 132–146)
TOTAL PROTEIN: 7.5 G/DL (ref 6.4–8.3)
TRIGL SERPL-MCNC: 58 MG/DL (ref 0–149)
WBC # BLD: 5 E9/L (ref 4.5–11.5)

## 2022-10-14 PROCEDURE — 84630 ASSAY OF ZINC: CPT

## 2022-10-14 PROCEDURE — 82525 ASSAY OF COPPER: CPT

## 2022-10-14 PROCEDURE — 84478 ASSAY OF TRIGLYCERIDES: CPT

## 2022-10-14 PROCEDURE — 82465 ASSAY BLD/SERUM CHOLESTEROL: CPT

## 2022-10-14 PROCEDURE — 85027 COMPLETE CBC AUTOMATED: CPT

## 2022-10-14 PROCEDURE — 84255 ASSAY OF SELENIUM: CPT

## 2022-10-14 PROCEDURE — 84425 ASSAY OF VITAMIN B-1: CPT

## 2022-10-14 PROCEDURE — 82728 ASSAY OF FERRITIN: CPT

## 2022-10-14 PROCEDURE — 36415 COLL VENOUS BLD VENIPUNCTURE: CPT

## 2022-10-14 PROCEDURE — 82746 ASSAY OF FOLIC ACID SERUM: CPT

## 2022-10-14 PROCEDURE — 84134 ASSAY OF PREALBUMIN: CPT

## 2022-10-14 PROCEDURE — 80053 COMPREHEN METABOLIC PANEL: CPT

## 2022-10-18 LAB
COPPER: 103.9 UG/DL (ref 70–140)
SELENIUM: 149.6 UG/L (ref 23–190)
ZINC: 64.8 UG/DL (ref 60–120)

## 2022-10-22 LAB — VITAMIN B1 WHOLE BLOOD: 153 NMOL/L (ref 70–180)

## 2022-10-24 ENCOUNTER — OFFICE VISIT (OUTPATIENT)
Dept: BARIATRICS/WEIGHT MGMT | Age: 37
End: 2022-10-24
Payer: COMMERCIAL

## 2022-10-24 VITALS
RESPIRATION RATE: 20 BRPM | SYSTOLIC BLOOD PRESSURE: 145 MMHG | HEART RATE: 65 BPM | BODY MASS INDEX: 29.12 KG/M2 | HEIGHT: 72 IN | DIASTOLIC BLOOD PRESSURE: 81 MMHG | TEMPERATURE: 97.6 F | WEIGHT: 215 LBS

## 2022-10-24 DIAGNOSIS — K91.2 MALNUTRITION FOLLOWING GASTROINTESTINAL SURGERY: Primary | ICD-10-CM

## 2022-10-24 PROCEDURE — 99213 OFFICE O/P EST LOW 20 MIN: CPT | Performed by: NURSE PRACTITIONER

## 2022-10-24 PROCEDURE — 99211 OFF/OP EST MAY X REQ PHY/QHP: CPT

## 2022-10-24 NOTE — PROGRESS NOTES
Arnaldo Mccormick  10/24/2022  [address]     Keyshawn-en- Y Gastric Bypass  3 Year Post-Operative Follow-up     Arnaldo Mccormick is a 31 y.o. male who is 3 years post Laparoscopic Keyshawn-en-Y Gastric Bypass surgery. Reports that he is doing well. He is not having swallowing difficulty. Patient denies nausea and vomiting. Patient denies gastric reflux symptoms. Bowel movements are normal per patient. Patient is compliant most of the time with the multivitamins and calcium + Vit D. He is meeting fluid recommendations of at least 64 ounces per day and is meeting protein recommendations. He  is exercising: cardiovascular equipment 45 minutes/day- 5 days/week and strength training 45 minutes/day- 5 days/week. Patient is not taking fiber supplements. Weight=215 lb (97.5 kg)  Today's weight represents a total weight loss of 160 pounds since surgery with 22 pounds regained since the lowest weight. Prior to Admission medications    Medication Sig Start Date End Date Taking? Authorizing Provider   omeprazole (PRILOSEC) 20 MG delayed release capsule Take 1 capsule by mouth Daily 1/24/22 1/24/23  Suzy Leiva MD   IRON PO Take 29 mg by mouth daily    Historical Provider, MD   Calcium Carbonate (CALCI-CHEW PO) Take 3 tablets by mouth daily Indications: Bar Adv    Historical Provider, MD   Multiple Vitamin (MULTI-VITAMIN DAILY PO) Take 2 tablets by mouth daily Indications:  Bar Adv     Historical Provider, MD   Cholecalciferol (VITAMIN D3) 5000 units CAPS Take one capsule every day 5/24/19   Corby Puri MD        No Known Allergies        Past Medical History:   Diagnosis Date    Anesthesia complication     stopped breathing with wisdom teeth removal    Morbid obesity due to excess calories [facility])        Past Surgical History:   Procedure Laterality Date    ADENOIDECTOMY      CHOLECYSTECTOMY, LAPAROSCOPIC N/A 8/14/2020    CHOLECYSTECTOMY LAPAROSCOPIC performed by Suzy Leiva MD at [address] those reviewed. General ROS: negative obtundation, AMS  ENT ROS: negative rhinorrhea, epistaxis  Allergy and Immunology ROS: negative itchy/watery eyes or nasal congestion  Hematological and Lymphatic ROS: negative spontaneous bleeding or bruising  Endocrine ROS: negative  lethargy, mood swings, palpitations or polydipsia/polyuria  Respiratory ROS: negative sputum changes, stridor, tachypnea or wheezing  Cardiovascular ROS: negative for - loss of consciousness, murmur or orthopnea  Gastrointestinal ROS: negative for - hematochezia or hematemesis  Genito-Urinary ROS: negative for -  genital discharge or hematuria  Musculoskeletal ROS: negative for - focal weakness, gangrene  Psych/Neuro ROS: negative for - visual or auditory hallucinations, suicidal ideation        Physical exam:   BP (!) 145/81 (Site: Left Lower Arm, Position: Sitting, Cuff Size: Medium Adult)   Pulse 65   Temp 97.6 °F (36.4 °C) (Temporal)   Resp 20   Ht 6' (1.829 m)   Wt 215 lb (97.5 kg)   BMI 29.16 kg/m²    General appearance: alert, appears stated age and cooperative  Head: Normocephalic, without obvious abnormality, atraumatic  Neck: no adenopathy, no carotid bruit, no JVD, supple, symmetrical, trachea midline and thyroid not enlarged, symmetric, no tenderness/mass/nodules  Lungs: clear to auscultation bilaterally  Heart: regular rate and rhythm  Abdomen: soft, non-tender; bowel sounds normal; no masses,  no organomegaly  Extremities: extremities normal, atraumatic, no cyanosis or edema    Assessment: Post Keyshawn-en- Y Gastric Bypass. He does not complain of GERD,  does not have sleep apnea,  does not have diabetes,  does not have hypertension off medical treatment. Cholesterol and triglycerides are normal.    1. Malnutrition following gastrointestinal surgery    - CBC; Future  - Comprehensive Metabolic Panel; Future  - Ferritin; Future  - Triglyceride;  Future  - Cholesterol, Total; Future  - Zinc; Future  - Vitamin B12; Future  - Folate; Future  - Vitamin B1; Future  - Vitamin D 25 Hydroxy; Future  - Prealbumin; Future  - Copper, Serum; Future  - Selenium serum; Future      Plan:  Continue to eat a high protein, low calorie diet, eat small portions very slowly and chew well before swallowing. Drink plenty of water and fluids. Make sure to use fiber to keep the bowels regular. Try to exercise 7 days per week, maintain adequate variety and balance. Always notify the clinic if you have any medical problems. Follow up in 12  months.       Physician Signature: Electronically signed by ZAID Lagunas CNP

## 2022-10-24 NOTE — PATIENT INSTRUCTIONS
Please continue to take your vitamin and mineral supplements as instructed. If you received a blood work prescription today for laboratory monitoring due prior to your next routine follow-up visit, please have this blood work obtained 10 to 14 days prior to your next visit. It is important to fast for 12 hours prior to routine weight loss surgery blood work, EXCEPT for drinking water, to ensure accuracy of results. Please report nausea, vomiting, abdominal pain, or any other problems you experience to your surgeon. For problems related to weight loss surgery, it is best to go to [address] [place] [address] Emergency Department and have your surgeon paged.

## 2022-10-24 NOTE — PROGRESS NOTES
Patient is 5 yr LRYGB. Water intake is around 60-80 oz daily. Protein through shakes and foods. Vitamin intake is good. Bowel movements are good. Labs are in epic.

## 2023-02-07 ENCOUNTER — APPOINTMENT (OUTPATIENT)
Dept: GENERAL RADIOLOGY | Age: 38
End: 2023-02-07
Payer: COMMERCIAL

## 2023-02-07 ENCOUNTER — HOSPITAL ENCOUNTER (EMERGENCY)
Age: 38
Discharge: HOME | End: 2023-02-07
Attending: STUDENT IN AN ORGANIZED HEALTH CARE EDUCATION/TRAINING PROGRAM
Payer: COMMERCIAL

## 2023-02-07 VITALS
SYSTOLIC BLOOD PRESSURE: 138 MMHG | HEART RATE: 59 BPM | OXYGEN SATURATION: 99 % | TEMPERATURE: 97.4 F | RESPIRATION RATE: 16 BRPM | DIASTOLIC BLOOD PRESSURE: 75 MMHG

## 2023-02-07 DIAGNOSIS — M79.604 RIGHT LEG PAIN: Primary | ICD-10-CM

## 2023-02-07 DIAGNOSIS — L03.115 CELLULITIS OF RIGHT LOWER EXTREMITY: ICD-10-CM

## 2023-02-07 LAB
ALBUMIN SERPL-MCNC: 4 G/DL (ref 3.5–5.2)
ALP BLD-CCNC: 112 U/L (ref 40–129)
ALT SERPL-CCNC: 9 U/L (ref 0–40)
ANION GAP SERPL CALCULATED.3IONS-SCNC: 10 MMOL/L (ref 7–16)
AST SERPL-CCNC: 18 U/L (ref 0–39)
BASOPHILS ABSOLUTE: 0.04 E9/L (ref 0–0.2)
BASOPHILS RELATIVE PERCENT: 0.5 % (ref 0–2)
BILIRUB SERPL-MCNC: 0.4 MG/DL (ref 0–1.2)
BUN BLDV-MCNC: 19 MG/DL (ref 6–20)
CALCIUM SERPL-MCNC: 9.2 MG/DL (ref 8.6–10.2)
CHLORIDE BLD-SCNC: 101 MMOL/L (ref 98–107)
CO2: 26 MMOL/L (ref 22–29)
CREAT SERPL-MCNC: 1.1 MG/DL (ref 0.7–1.2)
D DIMER: <200 NG/ML DDU
EOSINOPHILS ABSOLUTE: 0.18 E9/L (ref 0.05–0.5)
EOSINOPHILS RELATIVE PERCENT: 2.3 % (ref 0–6)
GFR SERPL CREATININE-BSD FRML MDRD: >60 ML/MIN/1.73
GLUCOSE BLD-MCNC: 98 MG/DL (ref 74–99)
HCT VFR BLD CALC: 43.5 % (ref 37–54)
HEMOGLOBIN: 14 G/DL (ref 12.5–16.5)
IMMATURE GRANULOCYTES #: 0.02 E9/L
IMMATURE GRANULOCYTES %: 0.3 % (ref 0–5)
LYMPHOCYTES ABSOLUTE: 2.34 E9/L (ref 1.5–4)
LYMPHOCYTES RELATIVE PERCENT: 30.5 % (ref 20–42)
MCH RBC QN AUTO: 28.6 PG (ref 26–35)
MCHC RBC AUTO-ENTMCNC: 32.2 % (ref 32–34.5)
MCV RBC AUTO: 89 FL (ref 80–99.9)
MONOCYTES ABSOLUTE: 0.52 E9/L (ref 0.1–0.95)
MONOCYTES RELATIVE PERCENT: 6.8 % (ref 2–12)
NEUTROPHILS ABSOLUTE: 4.58 E9/L (ref 1.8–7.3)
NEUTROPHILS RELATIVE PERCENT: 59.6 % (ref 43–80)
PDW BLD-RTO: 13.6 FL (ref 11.5–15)
PLATELET # BLD: 244 E9/L (ref 130–450)
PMV BLD AUTO: 10.3 FL (ref 7–12)
POTASSIUM REFLEX MAGNESIUM: 4.3 MMOL/L (ref 3.5–5)
RBC # BLD: 4.89 E12/L (ref 3.8–5.8)
SODIUM BLD-SCNC: 137 MMOL/L (ref 132–146)
TOTAL PROTEIN: 7.2 G/DL (ref 6.4–8.3)
WBC # BLD: 7.7 E9/L (ref 4.5–11.5)

## 2023-02-07 PROCEDURE — 6360000002 HC RX W HCPCS: Performed by: STUDENT IN AN ORGANIZED HEALTH CARE EDUCATION/TRAINING PROGRAM

## 2023-02-07 PROCEDURE — 73610 X-RAY EXAM OF ANKLE: CPT

## 2023-02-07 PROCEDURE — 99284 EMERGENCY DEPT VISIT MOD MDM: CPT

## 2023-02-07 PROCEDURE — 36415 COLL VENOUS BLD VENIPUNCTURE: CPT

## 2023-02-07 PROCEDURE — 80053 COMPREHEN METABOLIC PANEL: CPT

## 2023-02-07 PROCEDURE — 85378 FIBRIN DEGRADE SEMIQUANT: CPT

## 2023-02-07 PROCEDURE — 85025 COMPLETE CBC W/AUTO DIFF WBC: CPT

## 2023-02-07 PROCEDURE — 96374 THER/PROPH/DIAG INJ IV PUSH: CPT

## 2023-02-07 RX ORDER — DOXYCYCLINE HYCLATE 100 MG
100 TABLET ORAL 2 TIMES DAILY
Qty: 14 TABLET | Refills: 0 | Status: SHIPPED | OUTPATIENT
Start: 2023-02-07 | End: 2023-02-14

## 2023-02-07 RX ORDER — KETOROLAC TROMETHAMINE 30 MG/ML
15 INJECTION, SOLUTION INTRAMUSCULAR; INTRAVENOUS ONCE
Status: COMPLETED | OUTPATIENT
Start: 2023-02-07 | End: 2023-02-07

## 2023-02-07 RX ADMIN — KETOROLAC TROMETHAMINE 15 MG: 30 INJECTION, SOLUTION INTRAMUSCULAR; INTRAVENOUS at 22:58

## 2023-02-07 ASSESSMENT — ENCOUNTER SYMPTOMS
BACK PAIN: 0
ABDOMINAL PAIN: 0
PHOTOPHOBIA: 0
ABDOMINAL DISTENTION: 0
NAUSEA: 0
DIARRHEA: 0
VOMITING: 0
SHORTNESS OF BREATH: 0
COUGH: 0
CHEST TIGHTNESS: 0

## 2023-02-07 ASSESSMENT — PAIN DESCRIPTION - PAIN TYPE: TYPE: ACUTE PAIN

## 2023-02-07 ASSESSMENT — PAIN DESCRIPTION - ONSET: ONSET: ON-GOING

## 2023-02-07 ASSESSMENT — PAIN - FUNCTIONAL ASSESSMENT
PAIN_FUNCTIONAL_ASSESSMENT: 0-10
PAIN_FUNCTIONAL_ASSESSMENT: ACTIVITIES ARE NOT PREVENTED

## 2023-02-07 ASSESSMENT — PAIN DESCRIPTION - ORIENTATION: ORIENTATION: RIGHT;LOWER

## 2023-02-07 ASSESSMENT — LIFESTYLE VARIABLES
HOW MANY STANDARD DRINKS CONTAINING ALCOHOL DO YOU HAVE ON A TYPICAL DAY: PATIENT DOES NOT DRINK
HOW OFTEN DO YOU HAVE A DRINK CONTAINING ALCOHOL: NEVER

## 2023-02-07 ASSESSMENT — PAIN SCALES - GENERAL: PAINLEVEL_OUTOF10: 4

## 2023-02-07 ASSESSMENT — PAIN DESCRIPTION - FREQUENCY: FREQUENCY: CONTINUOUS

## 2023-02-07 ASSESSMENT — PAIN DESCRIPTION - LOCATION: LOCATION: LEG

## 2023-02-07 ASSESSMENT — PAIN DESCRIPTION - DESCRIPTORS: DESCRIPTORS: TIGHTNESS

## 2023-02-08 ENCOUNTER — HOSPITAL ENCOUNTER (OUTPATIENT)
Dept: ULTRASOUND IMAGING | Age: 38
Discharge: HOME | End: 2023-02-10
Payer: COMMERCIAL

## 2023-02-08 DIAGNOSIS — M79.604 RIGHT LEG PAIN: ICD-10-CM

## 2023-02-08 PROCEDURE — 93971 EXTREMITY STUDY: CPT

## 2023-02-08 NOTE — ED PROVIDER NOTES
Toño Coto is a 47-year-old male presents emergency department with concern for right leg pain and swelling. Patient stated that last Thursday slam basketball and began to have pain in his right calf after playing basketball. Patient denies any specific injury. Patient has tried resting and elevating the injury and has not had improvement in symptoms. Patient denies any history of VTE. Patient denies any recent long travel or surgery. Patient does have mild erythema but has not had fevers chills nausea vomiting. Patient denies any direct trauma. She denies chest pain or shortness of breath    The history is provided by the patient and medical records. Review of Systems   Constitutional:  Negative for chills, diaphoresis, fatigue and fever. Eyes:  Negative for photophobia and visual disturbance. Respiratory:  Negative for cough, chest tightness and shortness of breath. Cardiovascular:  Positive for chest pain. Negative for palpitations and leg swelling. Gastrointestinal:  Negative for abdominal distention, abdominal pain, diarrhea, nausea and vomiting. Genitourinary:  Negative for dysuria. Musculoskeletal:  Negative for back pain, neck pain and neck stiffness. Skin:  Negative for pallor and rash. Neurological:  Negative for headaches. Psychiatric/Behavioral:  Negative for confusion. Physical Exam  Vitals and nursing note reviewed. Constitutional:       General: He is not in acute distress. Appearance: Normal appearance. He is not ill-appearing. HENT:      Head: Normocephalic and atraumatic. Eyes:      General: No scleral icterus. Conjunctiva/sclera: Conjunctivae normal.      Pupils: Pupils are equal, round, and reactive to light. Cardiovascular:      Rate and Rhythm: Normal rate and regular rhythm. Comments: Intact distal pulses DP/PT  Pulmonary:      Effort: Pulmonary effort is normal.      Breath sounds: Normal breath sounds.    Abdominal:      General: Bowel sounds are normal. There is no distension. Palpations: Abdomen is soft. Tenderness: There is no abdominal tenderness. There is no guarding or rebound. Musculoskeletal:         General: Swelling and tenderness present. No deformity. Cervical back: Normal range of motion and neck supple. No rigidity. No muscular tenderness. Right lower leg: Edema present. Left lower leg: No edema. Comments: Mild pale erythema to right lower extremity pale erythema has well demarcated borders   Skin:     General: Skin is warm and dry. Capillary Refill: Capillary refill takes less than 2 seconds. Coloration: Skin is not pale. Findings: No erythema or rash. Neurological:      Mental Status: He is alert and oriented to person, place, and time. Psychiatric:         Mood and Affect: Mood normal.        Procedures     MDM  Number of Diagnoses or Management Options  Cellulitis of right lower extremity  Right leg pain  Diagnosis management comments: Maximino De La Rosa is a 29-year-old male presents emergency department with concern for right-sided leg pain patient has had swelling and pain since playing basketball. Patient has a specific injury. He has intact distal pulses and intact Achilles tendon on exam.  Patient does have tenderness over calf. Patient also has mild erythema to right lower leg  X-ray was unremarkable for acute process patient is able to ambulate and has normal plantarflexion dorsiflexion of the foot and on exam appears to have intact tendon.   Patient did notice symptoms after playing basketball possible patient's symptoms are partial tendon injury  Patient also does have mild erythema to the leg with diffuse swelling possible symptoms are due to cellulitis  D-dimer was less than 200 however patient does have significant swelling patient does have soft compartments and intact pulses  Patient does have significant swelling to the lower extremity ultrasound was ordered to evaluate for DVT patient was given a requisition to follow-up for right lower quadrant ultrasound with D-dimer being unremarkable patient was not anticoagulated before sent for ultrasound. Patient was advised to return ED if symptoms worsen or do not improve he is agreeable to plan and well-appearing at time of discharge  Patient will be given referral to Ortho for pain and possible injury. Patient will be treated with antibiotics for questionable cellulitis as patient did have erythema with well demarcated borders and ultrasound was ordered to evaluate for possible DVT despite D-dimer being less than 200  Patient is not having any chest pain or shortness of breath or systemic signs of infection  Patient has normal vital signs    Differential diagnosis considered and not limited to, cellulitis, DVT, tendon injury, fracture              --------------------------------------------- PAST HISTORY ---------------------------------------------  Past Medical History:  has a past medical history of Anesthesia complication and Morbid obesity due to excess calories ([facility] [address] Past Surgical History:  has a past surgical history that includes Tonsillectomy (1999); [place] tooth extraction (2011); Vasectomy (2016); Adenoidectomy; Endoscopy, colon, diagnostic (03/05/2018); fracture surgery; Keyshawn-en-Y Gastric Bypass (N/A, 7/23/2019); and Cholecystectomy, laparoscopic (N/A, 8/14/2020). Social History:  reports that he has never smoked. He has never used smokeless tobacco. He reports that he does not drink alcohol and does not use drugs. Family History: family history is not on file. The patients home medications have been reviewed. Allergies: Patient has no known allergies.     -------------------------------------------------- RESULTS -------------------------------------------------  Labs:  Results for orders placed or performed during the hospital encounter of 02/07/23   CBC with Auto Differential   Result Value Ref Range    WBC 7.7 4.5 - 11.5 E9/L    RBC 4.89 3.80 - 5.80 E12/L    Hemoglobin 14.0 12.5 - 16.5 g/dL    Hematocrit 43.5 37.0 - 54.0 %    MCV 89.0 80.0 - 99.9 fL    MCH 28.6 26.0 - 35.0 pg    MCHC 32.2 32.0 - 34.5 %    RDW 13.6 11.5 - 15.0 fL    Platelets 890 956 - 172 E9/L    MPV 10.3 7.0 - 12.0 fL    Neutrophils % 59.6 43.0 - 80.0 %    Immature Granulocytes % 0.3 0.0 - 5.0 %    Lymphocytes % 30.5 20.0 - 42.0 %    Monocytes % 6.8 2.0 - 12.0 %    Eosinophils % 2.3 0.0 - 6.0 %    Basophils % 0.5 0.0 - 2.0 %    Neutrophils Absolute 4.58 1.80 - 7.30 E9/L    Immature Granulocytes # 0.02 E9/L    Lymphocytes Absolute 2.34 1.50 - 4.00 E9/L    Monocytes Absolute 0.52 0.10 - 0.95 E9/L    Eosinophils Absolute 0.18 0.05 - 0.50 E9/L    Basophils Absolute 0.04 0.00 - 0.20 E9/L   Comprehensive Metabolic Panel w/ Reflex to MG   Result Value Ref Range    Sodium 137 132 - 146 mmol/L    Potassium reflex Magnesium 4.3 3.5 - 5.0 mmol/L    Chloride 101 98 - 107 mmol/L    CO2 26 22 - 29 mmol/L    Anion Gap 10 7 - 16 mmol/L    Glucose 98 74 - 99 mg/dL    BUN 19 6 - 20 mg/dL    Creatinine 1.1 0.7 - 1.2 mg/dL    Est, Glom Filt Rate >60 >=60 mL/min/1.73    Calcium 9.2 8.6 - 10.2 mg/dL    Total Protein 7.2 6.4 - 8.3 g/dL    Albumin 4.0 3.5 - 5.2 g/dL    Total Bilirubin 0.4 0.0 - 1.2 mg/dL    Alkaline Phosphatase 112 40 - 129 U/L    ALT 9 0 - 40 U/L    AST 18 0 - 39 U/L   D-Dimer, Quantitative   Result Value Ref Range    D-Dimer, Quant <200 ng/mL DDU       Radiology:  XR ANKLE RIGHT (MIN 3 VIEWS)   Final Result   No acute osseous or joint abnormality of the ankle. Diffuse soft tissue edema. US LOWER EXTREMITY RIGHT VEIN MAPPING W DVT    (Results Pending)       ------------------------- NURSING NOTES AND VITALS REVIEWED ---------------------------  Date / Time Roomed:  2/7/2023 10:37 PM  ED Bed Assignment:  05/05    The nursing notes within the ED encounter and vital signs as below have been reviewed.    /75   Pulse 59 Temp 97.4 °F (36.3 °C)   Resp 16   SpO2 99%   Oxygen Saturation Interpretation: Normal      ------------------------------------------ PROGRESS NOTES ------------------------------------------  2:17 AM EST  I have spoken with the patient and discussed todays results, in addition to providing specific details for the plan of care and counseling regarding the diagnosis and prognosis. Their questions are answered at this time and they are agreeable with the plan. I discussed at length with them reasons for immediate return here for re evaluation. They will followup with their primary care physician by calling their office tomorrow. --------------------------------- ADDITIONAL PROVIDER NOTES ---------------------------------  At this time the patient is without objective evidence of an acute process requiring hospitalization or inpatient management. They have remained hemodynamically stable throughout their entire ED visit and are stable for discharge with outpatient follow-up. The plan has been discussed in detail and they are aware of the specific conditions for emergent return, as well as the importance of follow-up. Discharge Medication List as of 2/7/2023 11:47 PM        START taking these medications    Details   doxycycline hyclate (VIBRA-TABS) 100 MG tablet Take 1 tablet by mouth 2 times daily for 7 days, Disp-14 tablet, R-0Normal             Diagnosis:  1. Right leg pain    2. Cellulitis of right lower extremity        Disposition:  Patient's disposition: Discharge to home  Patient's condition is stable.              Abbie Keane MD  02/08/23 [id number]

## 2023-02-28 ENCOUNTER — HOSPITAL ENCOUNTER (OUTPATIENT)
Age: 38
Discharge: HOME | End: 2023-02-28
Payer: COMMERCIAL

## 2023-02-28 LAB
ALBUMIN SERPL-MCNC: 4.1 G/DL (ref 3.5–5.2)
ALP BLD-CCNC: 103 U/L (ref 40–129)
ALT SERPL-CCNC: 14 U/L (ref 0–40)
ANION GAP SERPL CALCULATED.3IONS-SCNC: 9 MMOL/L (ref 7–16)
AST SERPL-CCNC: 21 U/L (ref 0–39)
BASOPHILS ABSOLUTE: 0.03 E9/L (ref 0–0.2)
BASOPHILS RELATIVE PERCENT: 0.6 % (ref 0–2)
BILIRUB SERPL-MCNC: 0.7 MG/DL (ref 0–1.2)
BUN BLDV-MCNC: 17 MG/DL (ref 6–20)
C-REACTIVE PROTEIN, HIGH SENSITIVITY: 0.6 MG/L (ref 0–3)
CALCIUM SERPL-MCNC: 9.4 MG/DL (ref 8.6–10.2)
CHLORIDE BLD-SCNC: 102 MMOL/L (ref 98–107)
CHOLESTEROL, TOTAL: 146 MG/DL (ref 0–199)
CO2: 27 MMOL/L (ref 22–29)
CREAT SERPL-MCNC: 1.3 MG/DL (ref 0.7–1.2)
EOSINOPHILS ABSOLUTE: 0.2 E9/L (ref 0.05–0.5)
EOSINOPHILS RELATIVE PERCENT: 4 % (ref 0–6)
FERRITIN: 75 NG/ML
FOLATE: 7.6 NG/ML (ref 4.8–24.2)
GFR SERPL CREATININE-BSD FRML MDRD: >60 ML/MIN/1.73
GLUCOSE BLD-MCNC: 100 MG/DL (ref 74–99)
HCT VFR BLD CALC: 43.7 % (ref 37–54)
HDLC SERPL-MCNC: 56 MG/DL
HEMOGLOBIN: 14.6 G/DL (ref 12.5–16.5)
IMMATURE GRANULOCYTES #: 0.02 E9/L
IMMATURE GRANULOCYTES %: 0.4 % (ref 0–5)
IRON SATURATION: 20 % (ref 20–55)
IRON: 66 MCG/DL (ref 59–158)
LDL CHOLESTEROL CALCULATED: 72 MG/DL (ref 0–99)
LYMPHOCYTES ABSOLUTE: 1.58 E9/L (ref 1.5–4)
LYMPHOCYTES RELATIVE PERCENT: 31.9 % (ref 20–42)
MCH RBC QN AUTO: 29.1 PG (ref 26–35)
MCHC RBC AUTO-ENTMCNC: 33.4 % (ref 32–34.5)
MCV RBC AUTO: 87.2 FL (ref 80–99.9)
MONOCYTES ABSOLUTE: 0.41 E9/L (ref 0.1–0.95)
MONOCYTES RELATIVE PERCENT: 8.3 % (ref 2–12)
NEUTROPHILS ABSOLUTE: 2.71 E9/L (ref 1.8–7.3)
NEUTROPHILS RELATIVE PERCENT: 54.8 % (ref 43–80)
PDW BLD-RTO: 13 FL (ref 11.5–15)
PLATELET # BLD: 227 E9/L (ref 130–450)
PMV BLD AUTO: 10.2 FL (ref 7–12)
POTASSIUM SERPL-SCNC: 4.3 MMOL/L (ref 3.5–5)
PRO-BNP: 15 PG/ML (ref 0–125)
RBC # BLD: 5.01 E12/L (ref 3.8–5.8)
SODIUM BLD-SCNC: 138 MMOL/L (ref 132–146)
TOTAL IRON BINDING CAPACITY: 329 MCG/DL (ref 250–450)
TOTAL PROTEIN: 7.4 G/DL (ref 6.4–8.3)
TRIGL SERPL-MCNC: 91 MG/DL (ref 0–149)
TSH SERPL DL<=0.05 MIU/L-ACNC: 2.56 UIU/ML (ref 0.27–4.2)
VITAMIN B-12: 621 PG/ML (ref 211–946)
VITAMIN D 25-HYDROXY: 31 NG/ML (ref 30–100)
VLDLC SERPL CALC-MCNC: 18 MG/DL
WBC # BLD: 5 E9/L (ref 4.5–11.5)

## 2023-02-28 PROCEDURE — 80053 COMPREHEN METABOLIC PANEL: CPT

## 2023-02-28 PROCEDURE — 80061 LIPID PANEL: CPT

## 2023-02-28 PROCEDURE — 82746 ASSAY OF FOLIC ACID SERUM: CPT

## 2023-02-28 PROCEDURE — 82607 VITAMIN B-12: CPT

## 2023-02-28 PROCEDURE — 83880 ASSAY OF NATRIURETIC PEPTIDE: CPT

## 2023-02-28 PROCEDURE — 84443 ASSAY THYROID STIM HORMONE: CPT

## 2023-02-28 PROCEDURE — 82728 ASSAY OF FERRITIN: CPT

## 2023-02-28 PROCEDURE — 83550 IRON BINDING TEST: CPT

## 2023-02-28 PROCEDURE — 82306 VITAMIN D 25 HYDROXY: CPT

## 2023-02-28 PROCEDURE — 85025 COMPLETE CBC W/AUTO DIFF WBC: CPT

## 2023-02-28 PROCEDURE — 86141 C-REACTIVE PROTEIN HS: CPT

## 2023-02-28 PROCEDURE — 36415 COLL VENOUS BLD VENIPUNCTURE: CPT

## 2023-02-28 PROCEDURE — 83540 ASSAY OF IRON: CPT

## 2023-10-24 ENCOUNTER — TELEPHONE (OUTPATIENT)
Dept: BARIATRICS/WEIGHT MGMT | Age: 38
End: 2023-10-24

## 2023-10-24 NOTE — TELEPHONE ENCOUNTER
Patient cancelled appt on 10- and did not wish to reschedule for this year. Requested appt in July next year. Appt scheduled.

## 2024-01-13 ENCOUNTER — HOSPITAL ENCOUNTER (EMERGENCY)
Age: 39
Discharge: HOME | End: 2024-01-13
Payer: COMMERCIAL

## 2024-01-13 ENCOUNTER — APPOINTMENT (OUTPATIENT)
Dept: CT IMAGING | Age: 39
End: 2024-01-13
Payer: COMMERCIAL

## 2024-01-13 VITALS
SYSTOLIC BLOOD PRESSURE: 128 MMHG | HEART RATE: 76 BPM | BODY MASS INDEX: 29.12 KG/M2 | HEIGHT: 72 IN | TEMPERATURE: 98.1 F | RESPIRATION RATE: 16 BRPM | DIASTOLIC BLOOD PRESSURE: 69 MMHG | OXYGEN SATURATION: 99 % | WEIGHT: 215 LBS

## 2024-01-13 DIAGNOSIS — N30.01 ACUTE CYSTITIS WITH HEMATURIA: Primary | ICD-10-CM

## 2024-01-13 LAB
ANION GAP SERPL CALCULATED.3IONS-SCNC: 9 MMOL/L (ref 7–16)
BASOPHILS # BLD: 0.03 K/UL (ref 0–0.2)
BASOPHILS NFR BLD: 0 % (ref 0–2)
BILIRUB UR QL STRIP: NEGATIVE
BUN SERPL-MCNC: 15 MG/DL (ref 6–20)
CALCIUM SERPL-MCNC: 9.2 MG/DL (ref 8.6–10.2)
CHLORIDE SERPL-SCNC: 105 MMOL/L (ref 98–107)
CLARITY UR: CLEAR
CO2 SERPL-SCNC: 27 MMOL/L (ref 22–29)
COLOR UR: YELLOW
CREAT SERPL-MCNC: 1.1 MG/DL (ref 0.7–1.2)
EOSINOPHIL # BLD: 0.11 K/UL (ref 0.05–0.5)
EOSINOPHILS RELATIVE PERCENT: 2 % (ref 0–6)
ERYTHROCYTE [DISTWIDTH] IN BLOOD BY AUTOMATED COUNT: 12.8 % (ref 11.5–15)
GFR SERPL CREATININE-BSD FRML MDRD: >60 ML/MIN/1.73M2
GLUCOSE SERPL-MCNC: 100 MG/DL (ref 74–99)
GLUCOSE UR STRIP-MCNC: NEGATIVE MG/DL
HCT VFR BLD AUTO: 44.5 % (ref 37–54)
HGB BLD-MCNC: 14.8 G/DL (ref 12.5–16.5)
HGB UR QL STRIP.AUTO: ABNORMAL
IMM GRANULOCYTES # BLD AUTO: <0.03 K/UL (ref 0–0.58)
IMM GRANULOCYTES NFR BLD: 0 % (ref 0–5)
KETONES UR STRIP-MCNC: NEGATIVE MG/DL
LEUKOCYTE ESTERASE UR QL STRIP: NEGATIVE
LYMPHOCYTES NFR BLD: 1.36 K/UL (ref 1.5–4)
LYMPHOCYTES RELATIVE PERCENT: 20 % (ref 20–42)
MCH RBC QN AUTO: 29 PG (ref 26–35)
MCHC RBC AUTO-ENTMCNC: 33.3 G/DL (ref 32–34.5)
MCV RBC AUTO: 87.1 FL (ref 80–99.9)
MONOCYTES NFR BLD: 0.48 K/UL (ref 0.1–0.95)
MONOCYTES NFR BLD: 7 % (ref 2–12)
NEUTROPHILS NFR BLD: 70 % (ref 43–80)
NEUTS SEG NFR BLD: 4.71 K/UL (ref 1.8–7.3)
NITRITE UR QL STRIP: NEGATIVE
PH UR STRIP: 6 [PH] (ref 5–9)
PLATELET # BLD AUTO: 232 K/UL (ref 130–450)
PMV BLD AUTO: 10.1 FL (ref 7–12)
POTASSIUM SERPL-SCNC: 4.7 MMOL/L (ref 3.5–5)
PROT UR STRIP-MCNC: NEGATIVE MG/DL
RBC # BLD AUTO: 5.11 M/UL (ref 3.8–5.8)
RBC #/AREA URNS HPF: ABNORMAL /HPF
SODIUM SERPL-SCNC: 141 MMOL/L (ref 132–146)
SP GR UR STRIP: <1.005 (ref 1–1.03)
UROBILINOGEN UR STRIP-ACNC: 0.2 EU/DL (ref 0–1)
WBC #/AREA URNS HPF: ABNORMAL /HPF
WBC OTHER # BLD: 6.7 K/UL (ref 4.5–11.5)

## 2024-01-13 PROCEDURE — 2580000003 HC RX 258: Performed by: NURSE PRACTITIONER

## 2024-01-13 PROCEDURE — 99284 EMERGENCY DEPT VISIT MOD MDM: CPT

## 2024-01-13 PROCEDURE — 81001 URINALYSIS AUTO W/SCOPE: CPT

## 2024-01-13 PROCEDURE — 87491 CHLMYD TRACH DNA AMP PROBE: CPT

## 2024-01-13 PROCEDURE — 85025 COMPLETE CBC W/AUTO DIFF WBC: CPT

## 2024-01-13 PROCEDURE — 80048 BASIC METABOLIC PNL TOTAL CA: CPT

## 2024-01-13 PROCEDURE — 87591 N.GONORRHOEAE DNA AMP PROB: CPT

## 2024-01-13 PROCEDURE — 74176 CT ABD & PELVIS W/O CONTRAST: CPT

## 2024-01-13 PROCEDURE — 87086 URINE CULTURE/COLONY COUNT: CPT

## 2024-01-13 RX ORDER — DOXYCYCLINE HYCLATE 100 MG/1
100 CAPSULE ORAL ONCE
Status: DISCONTINUED | OUTPATIENT
Start: 2024-01-13 | End: 2024-01-13

## 2024-01-13 RX ORDER — 0.9 % SODIUM CHLORIDE 0.9 %
1000 INTRAVENOUS SOLUTION INTRAVENOUS ONCE
Status: COMPLETED | OUTPATIENT
Start: 2024-01-13 | End: 2024-01-13

## 2024-01-13 RX ORDER — DOXYCYCLINE HYCLATE 100 MG
100 TABLET ORAL 2 TIMES DAILY
Qty: 14 TABLET | Refills: 0 | Status: SHIPPED | OUTPATIENT
Start: 2024-01-13 | End: 2024-01-13

## 2024-01-13 RX ADMIN — SODIUM CHLORIDE 1000 ML: 9 INJECTION, SOLUTION INTRAVENOUS at 11:42

## 2024-01-13 ASSESSMENT — PAIN - FUNCTIONAL ASSESSMENT: PAIN_FUNCTIONAL_ASSESSMENT: NONE - DENIES PAIN

## 2024-01-13 NOTE — DISCHARGE INSTRUCTIONS
Your CAT scan is showing some bladder wall thickening consistent with cystitis however I do not feel that this is infectious.  You have no bacteria or white blood cells in your urine.  This is likely some bladder irritation.  You will need to call urology to schedule an appointment.  Please call the phone number below.  The gonorrhea and Chlamydia testing is pending, you may check your results on your MyChart.

## 2024-01-13 NOTE — ED PROVIDER NOTES
Independent YIN Visit.     [facility] EMERGENCY DEPARTMENT  EMERGENCY DEPARTMENT ENCOUNTER      Pt Name: Kyle Garcia  MRN: [id number]  Birthdate 1985  Date of evaluation: 1/13/2024  Provider: ZAID Ceja - CNP  PCP: Inderjit Ennis MD  Note Started: 11:59 AM EST 1/13/24    CHIEF COMPLAINT       Chief Complaint   Patient presents with    Urinary Tract Infection     Burning with urination and blood noted.  Started this am.        HISTORY OF PRESENT ILLNESS: 1 or more Elements   History From: Patient  Limitations to history : None    Kyle Garcia is a 38 y.o. male who has a past medical history of morbid obesity, hepatic steatosis, hiatal hernia, partial gastrectomy presents to emergency department with blood in his urine that he noticed this morning.  Patient states that he has some very mild dysuria with urination but no pain.  Denies any pain to his rectum, denies any tingling to the tip of his penis.  Denies any abdominal pain, back or flank pain, has had no fevers.  Noticed this morning at the end of his urination that he had some streaks of blood, states that he got into the shower and noticed a blood clot fell out of the tip of his penis.  Denies any concern for STDs.  States that his urine stream is normal there is no hesitancy, does not feel he is retaining urine.  States that he has never had symptoms like this in the past.    Nursing Notes were all reviewed and agreed with or any disagreements were addressed in the HPI.    REVIEW OF SYSTEMS :    Positives and Pertinent negatives as per HPI.     PAST MEDICAL HISTORY/Chronic Conditions Affecting Care    has a past medical history of Anesthesia complication and Morbid obesity due to excess calories (HCC).     SURGICAL HISTORY     Past Surgical History:   Procedure Laterality Date    ADENOIDECTOMY      CHOLECYSTECTOMY, LAPAROSCOPIC N/A 8/14/2020    CHOLECYSTECTOMY LAPAROSCOPIC performed by Cliff Orantes MD at  calculus, prostatitis to name a few     CBC is ordered to evaluate for any signs of infection or inflammation by obtaining a WBC count, or any signs of acute anemia by interpreting hemoglobin. BMP was ordered to evaluate for any electrolyte imbalances, kidney function, or any elevations in anion gap. Urinalysis ordered to evaluate for a UTI and/or hematuria. A CT abdomen with IV contrast was ordered to evaluate for, but without limitation to, ureterolithiasis, nephrolithiasis, constipation, hollow organ perforation, small bowel obstruction, bowel ischemia, pneumoperitoneum, diverticulitis, cholecystitis, appendicitis, perforation.    Please refer to ED course as above, patient's urine had large urine hemoglobin high urine specific gravity, 10-20 per high-power field RBCs with no bacteria, nitrite negative, leukocyte Estrace negative.  Given this finding CT abdomen pelvis was ordered to evaluate for a renal calculus, hydronephrosis, cystitis.  Labs were otherwise unremarkable.  CT abdomen pelvis was concerning for bladder wall thickening consistent with an acute cystitis.  I discussed these findings with patient discussing that he will need to follow-up with urology as this is a nonspecific finding and likely the source of his.  I did offer STD testing and treatment to patient, patient adamantly states that he has only ever been with his wife and he does not have any concern for STDs.  States that he is okay with sending the gonorrhea and chlamydia but does not want any treatment at this time.  As patient does not have any evidence of an infectious process antibiotic therapy will be deferred for now.  I discussed other differentials for bladder wall thickening including irritation, bladder wall dysplasia.  Advised that he will need to follow-up with urology.  Follow-up information was provided.  Discussed drinking plenty of fluids.  Discussed strict return to ER precautions.    Patient was explicitly instructed on

## 2024-01-15 LAB
MICROORGANISM SPEC CULT: NO GROWTH
SPECIMEN DESCRIPTION: NORMAL

## 2024-01-16 LAB
CHLAMYDIA DNA UR QL NAA+PROBE: NEGATIVE
N GONORRHOEA DNA UR QL NAA+PROBE: NEGATIVE
SPECIMEN DESCRIPTION: NORMAL

## 2024-01-26 LAB — NON-GYN CYTOLOGY REPORT: NORMAL

## 2024-05-20 NOTE — ANESTHESIA POSTPROCEDURE EVALUATION
274}  INITIAL VISIT: PSYCHIATRY  [facility]      ASSESSMENT AND PLAN:     DIAGNOSES & PROBLEMS:  Alcohol use disorder, severe  Major depressive disorder, recurrent moderate   Generalized anxiety disorder   Unspecified neurocognitive disorder    In Summary:  Pt is a 66yo female with past medical history of COPD, HTN, HLD, subclinical hypothyroidism, chronic lymphocytic leukemia, T2DM, Afib with RVR, fibromyalgia, and fatty liver, who presents to [facility] for continued treatment of alcohol use disorder.  Pt presents to the [facility] at the insistence of her  for continued treatment.  She was in residential rehab, which she completed about a week ago.  Last use of alcohol was about 35 days prior to admission to [facility].  Pt feels mood has been down for years and denies anything being helpful for her mood.  Per , mood has improved some since she got back from rehab.          Plan:  -Continue Lexapro 20mg daily  -Continue Wellbutrin SR 200mg daily  -Continue trazodone 200mg nightly  -Stop Zyprexa 7.5mg nightly  -Re-start naltrexone 50mg daily     - admit to ORP and initiate program protocol - patient oriented to rules and expectations of the program; while in program will monitor routine VS, breathalyzer and alcohol/drug screenings  - options for medication-assisted treatment (MAT) for alcohol use disorder were discussed  - routine monitoring of liver function while on MAT  - monitor random drug/alcohol screening  - routine monitoring of PETH levels to assess for maintenance of sobriety  - full engagement in 12 step (or equivalent) recovery program(s), including meeting attendance and acquisition/maintenance of sponsor  - relapse prevention and motivational interviewing provided       PRESENTATION:     Earl Abdul presents with the following chief complaint: alcohol and/or drug addiction    Per Chart:  Pt well known to addiction psychiatry service at [facility].  She last was seen by psychiatry 4/12/24  Department of Anesthesiology  Postprocedure Note    Patient: Artur Vo  MRN: [id number]  YOB: 1985  Date of evaluation: 8/14/2020  Time:  2:45 PM     Procedure Summary     Date:  08/14/20 Room / Location:  [address] [place] [address]    Anesthesia Start:  7653 Anesthesia Stop:  1515    Procedure:  CHOLECYSTECTOMY LAPAROSCOPIC (N/A ) Diagnosis:  (GALLSTONES)    Surgeon:  Jesus De MD Responsible Provider:  Wellington Benavidez MD    Anesthesia Type:  general ASA Status:  2          Anesthesia Type: general    Star Phase I: Star Score: 8    Star Phase II:      Last vitals: Reviewed and per EMR flowsheets.        Anesthesia Post Evaluation    Patient location during evaluation: PACU  Patient participation: complete - patient participated  Level of consciousness: awake  Pain score: 3  Airway patency: patent  Nausea & Vomiting: no nausea  Complications: no  Cardiovascular status: blood pressure returned to baseline  Respiratory status: acceptable  Hydration status: euvolemic "for alcohol withdrawal.  She was started on naltrexone 50mg daily and lexapro was increased to 20mg daily.  After detox, she went to residential rehab.     Per Patient:  Pt completed 30 day residential rehab program in [place].  She says that she finished the program over a week ago.  No relapses since.  She still feels she has cravings.  She avoids going to grocery store where she was buying vodka.  Pt says that when she would enter the store, the associate would immediately set her bottle of vodka down on the counter since that's all she would ever get.  Pt says she is getting along wonderfully with her  and repeatedly states that he is wonderful.  She says she drinks because she doesn't have a sex life with her  over the past 20 years.  Many years ago they did go to couple's therapy, but she says it was unhelpful. Pt says she has also struggled coping with the death of her son, who  3 years ago of alcohol and drug use.  Pt denies ever drinking prior to the death of her son. Pt says she isn't thrilled about being in IOP but is here because her  wants it.  She reports being motivated to stay sober because she is concerned that she has been diagnosed with a fatty liver. She has been in AA before, but says that she doesn't like it.  Pt says she finds the stories depressing.  However, she says she is willing to go to AA again.      Mood has been "kind of what the fuck".  She feels mood has been down for years.  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  Energy has been down.  Concentration has been poor.  Endorses guilt, hopelessness, and worthlessness.  Denies SI/HI.  She's looking forward to going to the beach this summer with family and they are discussing plans to take a trip overseas.  Sleep has been poor for years.  No acute change in sleep.  She currently takes trazodone 200mg nightly, which she thinks is helpful.  When she doesn't take it, she says she doesn't sleep at all.  " Pt is unsure of all the medications she takes.  She says she manages her medications herself.  Denies symptoms suggestive of gabe.    Pt says she worries about everything and has always been a worrier.  She feels she spends the majority of the day worrying and that family would even comment on her worrying when she was a child.  She reports feeling physical tension, difficulty relaxing and whole body pain when feeling especially anxious.         Pt reports difficulty with memory that she first noticed about a year ago.  She notices she forgets names of people she has recently met, but remembers names always of people she has known for awhile.  Sometimes when driving in the car, she forgets where she is going.  She denies ever getting lost.        Collateral:   Pt provides permission to reach out to her  for collateral and to discuss her treatment with him.      Henrique Abdul,   [phone]     states she has not been drinking since discharge from [facility] on Tuesday or Wednesday of last week.  Last night she didn't sleep very well, but typically has been sleeping well with trazodone.  She has been having some memory issues for at least a year.  He has noticed issues with her short term memory.  She will forget things like what she had for dinner just a couple of hours after eating.  She will forget about plans they have made.  The [facility] in [place] evaluated her for cognitive impairment in March 2024 and she was diagnosed with dementia.    She was driving prior to residential rehab without issues.  He took her key so she wouldn't drive because of concerns for drinking and driving.  He had briefly moved out of the house due to her drinking.  She has been drinking for 14 years.  First residential rehab was around 2010.  She has been to rehab about 15 times.  Several times she was sober for about 6 months at a time, but would slowly start drinking again.  In recent years she is only sober when  in a facility or 1-2 weeks after being out of a facility.  He has been helping her manage medications.  He fills the medications in her pill organizer weekly.  Denies paranoia and AVH.  Pt has had hallucinations due to withdrawal in the past, but never hallucinations outside of this context.  She was on naltrexone before, but it wasn't prescribed when she left rehab.  Zyprexa 7.5mg nightly was recently added in rehab.  He isn't sure why it was added and says the indication on the prescription bottle says for mood.         Current medications: Zyprexa 7.5mg nightly, trazodone 200mg nightly, lexapro 20mg daily, Wellbutrin SR 200mg daily            REVIEW OF SYSTEMS:    [x] Y  [] N  sleep disturbance: chronic poor sleep   [x] Y  [] N  appetite/weight change: increased appetite recently (pt recently put on prednisone and zyprexa)  [x] Y  [] N  fatigue/anergia:   [x] Y  [] N  impairment in focus/concentration:     [x] Y  [] N  depression:   [x] Y  [] N  anxiety/worry:   [] Y  [x] N  dysregulated mood/behavior:  [] Y  [x] N  manic symptomatology:   [] Y  [x] N  psychosis:       A pertinent medical review of systems was performed with the following notable findings: back pain    CURRENT PSYCHOTROPIC REGIMEN:  I[x]I Y  I[]I N  I[]I U    Zyprexa 7.5mg nightly, trazodone 200mg nightly, lexapro 20mg daily, Wellbutrin SR 200mg daily      ADDICTION:     I[]I N/A  I[]I Y  I[x]I N  I[]I U  WITHDRAWAL SYMPTOMS:   I[]I N/A  I[x]I Y  I[]I N  I[]I U  CRAVINGS:     I[]I Y  I[x]I N  I[]I U  I[]I Current  I[]I Former  Nicotine Use:   I[x]I Y  I[]I N  I[]I U  I[]I Current  I[]I Former  Alcohol Use:   I[x]I Y  I[]I N  I[]I U  I[]I Current  I[]I Former  Alcohol Misuse/Abuse:   I[]I Y  I[x]I N  I[]I U  I[]I Current  I[]I Former  Illicit Drug Use/Misuse/Abuse:   I[]I Y  I[x]I N  I[]I U  I[]I Current  I[]I Former  Misuse/Abuse of Rx Medications:   I[]I Cannabis  I[]I Cocaine  I[]I Heroin  I[]I Meth  I[]I  "Opioids  I[]I Stimulants  I[]I Benzos  I[]I Other:     I[]I N/A  I[]I U  Substance(s) of Choice: alcohol   I[]I N/A  I[]I U  Substance(s) Used Currently/Recently: alcohol  I[]I N/A  I[]I U  Alcohol Consumption: Was drinking 1/5th of vodka daily at peak use  I[]I N/A  I[]I U  Last Drink: About 35 days ago (Mid April)  I[x]I N/A  I[]I U  Last Drug Use:   I[]I N/A  I[]I U  Duration of Sobriety/Abstinence: About 35 days    I[x]I Y  I[]I N  I[]I U  Hx of Detox:   I[x]I Y  I[]I N  I[]I U  Hx of Rehab:   I[]I Y  I[x]I N  I[]I U  Hx of IVDU:   I[]I Y  I[x]I N  I[]I U  Hx of Accidental Overdose:   I[]I Y  I[x]I N  I[]I U  Hx of DUI:   I[x]I Y  I[]I N  I[]I U  Hx of Complicated Withdrawal (i.e. Seizures and/or Delirium Tremens):   I[]I Y  I[]I N  I[x]I U  Hx of Known/Suspected Substance-Induced Psychiatric Disorder:   I[x]I Y  I[]I N  I[]I U  Hx of Medication Assisted Treatment:   I[x]I Y  I[]I N  I[]I U  Hx of Twelve Step Program (or Equivalent) Involvement:   I[]I Y  I[x]I N  I[]I U  Currently Exhibits Signs of Intoxication:   I[]I Y  I[x]I N  I[]I U  Currently Exhibits Signs of Withdrawal:   I[]I Y  I[x]I N  I[]I U  Currently Active in Recovery:   I[x]I Y  I[]I N  I[]I U  Social Support:   I[x]I Y  I[]I N  I[]I U  Spouse/Partner Consumption:  doesn't drink in front of her and there is no alcohol in the home currently    I[]I N/A  I[x]I Y  I[]I N  I[]I U  Acknowledges/Accepts Addiction:   I[]I N/A  I[x]I Y  I[]I N  I[]I U  Advised to Quit/Cut Back:   I[]I N/A  I[x]I Y  I[]I N  I[]I U  Alcohol/Drug Cessation ("Wants to Quit"): Patient Successfully Quit, Motivational Interviewing Provided, Medication Assisted Treatment Prescribed  I[]I N/A  I[x]I Y  I[]I N  I[]I U  Motivation to Pursue Treatment: health (pt says she has fatty liver and beginnings of cirrhosis)  I[x]I N/A  I[]I Y  I[]I N  I[]I U  Tobacco Cessation ("Wants to Quit"):     I[]I N/A  I[x]I Y  I[]I N  I[]I U  I[]I A  Awareness of Biomedical Complications: "   I[]I N/A  I[x]I Y  I[]I N  I[]I U  I[]I A  Understands Need for Lifetime Sobriety:     View/Acceptance of Twelve Step (or Equivalent) Programs: states she hates AA, but is willing to go    DSM-5-TR SUBSTANCE USE DISORDER CRITERIA:     -- Impaired Control:  I[x]I Y  I[]I N  I[]I U  I[]I A  I[]I D  Often take in larger amounts or over a longer period of time than was intended:   I[x]I Y  I[]I N  I[]I U  I[]I A  I[]I D  Persistent desire or unsuccessful efforts to cut down or control use:   I[]I Y  I[]I N  I[x]I U  I[]I A  I[]I D  Great deal of time spent in activities necessary to obtain substance, use, or recover from effects:   I[x]I Y  I[]I N  I[]I U  I[]I A  I[]I D  Craving/strong desire for substance or urge to use:   -- Social Impairment:  I[x]I Y  I[]I N  I[]I U  I[]I A  I[]I D  Use resulting in failure to fulfill major role obligations at home, work or school:   I[x]I Y  I[]I N  I[]I U  I[]I A  I[]I D  Social, occupational, recreational activities decreased because of use:   I[]I Y  I[]I N  I[x]I U  I[]I A  I[]I D  Continued use despite having persistent or recurrent social or interpersonal problems caused or exacerbated by the substance:   -- Risky Use:  I[x]I Y  I[]I N  I[]I U  I[]I A  I[]I D  Recurrent use in situations in which it is physically hazardous:   I[x]I Y  I[]I N  I[]I U  I[]I A  I[]I D  Use despite physical or psychological problems that are likely to have been caused or exacerbated by the substance:   -- Neuroadaptation:  I[x]I Y  I[]I N  I[]I U  I[]I A  I[]I D  Tolerance, as defined by either of the following: (1) a need for markedly increased amounts of substance to achieve intoxication or desired effect.  -OR- (2) a markedly diminished effect with continued use of the same amount of substance:   I[x]I Y  I[]I N  I[]I U  I[]I A  I[]I D  Withdrawal, as manifested by either of the following: (1) the characteristic withdrawal syndrome for substance.  -OR- (2) substance is taken to relieve or  "avoid withdrawal symptoms:   -- Mild (1-3), Moderate (4-5), Severe (?6)    I[]I N/A  I[x]I Y  I[]I N  I[]I U  I[]I A  I[]I D  Active Substance Use Disorder:       HISTORY:     I[x]I Y  I[]I N  I[]I U  Psychiatric Diagnoses: major depressive disorder, generalized anxiety disorder   I[]I Y  I[x]I N  I[]I U  Current Psychiatric Provider (if Applicable):   I[]I Y  I[x]I N  I[]I U  Hx of Psychiatric Hospitalization:   I[]I Y  I[x]I N  I[]I U  Hx of Outpatient Psychiatric Treatment (psychiatry/psychotherapy): therapist "Geoffrey", has seen 5 years, has not seen since getting out of rehab  I[x]I Y  I[]I N  I[]I U  Psychotropic Trials: Cymbalta, lexapro, trazodone, seroquel, zyprexa, naltrexone  I[]I Y  I[x]I N  I[]I U  Prior Suicide Attempts:   I[x]I Y  I[]I N  I[]I U  Hx of Suicidal Ideation: yes, last about 5 months ago  I[]I Y  I[x]I N  I[]I U  Hx of Homicidal Ideation:   I[]I Y  I[x]I N  I[]I U  Hx of Self-Injurious Behavior (Non-Suicidal):   I[]I Y  I[x]I N  I[]I U  Hx of Violence:   I[]I Y  I[x]I N  I[]I U  Documented Hx of Malingering:     FAMILY HISTORY:  I[x]I Y  I[]I N  I[]I U    Son-alcohol and opioid abuse    I[]I Y  I[]I N  I[x]I U  Hx of Trauma/Neglect:   I[x]I Y  I[]I N  I[]I U  Hx of Physical Abuse:   I[]I Y  I[x]I N  I[]I U  Hx of Sexual Abuse:   I[]I Y  I[x]I N  I[]I U  Happy Childhood:   I[]I Y  I[x]I N  I[]I U  Significant Developmental Delay/Disability:   I[x]I Y  I[]I N  I[]I U  GED/High School Diploma or Beyond:   I[x]I Y  I[]I N  I[]I U  Currently Employed:   I[]I Y  I[x]I N  I[]I U  On or Applying for Disability:   I[x]I Y  I[]I N  I[]I U  Functions Independently:   I[]I Y  I[x]I N  I[]I U  Financially Stable:   I[x]I Y  I[]I N  I[]I U  Domiciled:   I[]I Y  I[x]I N  I[]I U  Lives Alone:   I[x]I Y  I[]I N  I[]I U  Intact Support System:   I[x]I Y  I[]I N  I[]I U  Heterosexual/Cisgender:   I[x]I Y  I[]I N  I[]I U  Currently in a Romantic Relationship:   I[x]I Y  I[]I N  I[]I U  Ever :   I[x]I Y  " I[]I N  I[]I U  Children/Dependents: one adult son, strained relationship  I[x]I Y  I[]I N  I[]I U  Mandaeism/Spiritual: identifies as being spiritual  I[]I Y  I[x]I N  I[]I U   History:   I[]I Y  I[x]I N  I[]I U  Current Legal Issues:   I[]I Y  I[x]I N  I[]I U  Past Charges/Convictions:   I[]I Y  I[x]I N  I[]I U  Hx of Incarceration:   I[]I Y  I[]I N  I[x]I U  Access to a Gun?:     I[]I Y  I[]I N  I[x]I U  Hx of Seizure:   I[]I Y  I[x]I N  I[]I U  Hx of Significant Head Injury (e.g., Loss of Consciousness, Concussion, Coma):   I[]I Y  I[]I N  I[]I U  Medical History & Diagnoses:     The patient's past medical history has been reviewed and updated as appropriate within the electronic medical record system.  Pt with past medical history of COPD, HTN, HLD, dementia, subclinical hypothyroidism, T2DM, Afib with RVR, and bilateral mastectomy    Scheduled and PRN Medications: The electronic chart was reviewed and updated as appropriate.  See Medcard for details.    Current Outpatient Medications:     apixaban (ELIQUIS) 5 mg Tab, Take 1 tablet (5 mg total) by mouth 2 (two) times daily., Disp: 60 tablet, Rfl: 0    buPROPion (WELLBUTRIN SR) 200 MG SR12, Take 200 mg by mouth once daily., Disp: , Rfl:     EScitalopram oxalate (LEXAPRO) 20 MG tablet, Take 1 tablet (20 mg total) by mouth once daily., Disp: 30 tablet, Rfl: 0    folic acid (FOLVITE) 1 MG tablet, Take 1 tablet (1 mg total) by mouth once daily., Disp: 30 tablet, Rfl: 0    gabapentin (NEURONTIN) 300 MG capsule, Take 1 capsule (300 mg total) by mouth 3 (three) times daily. (Patient taking differently: Take 400 mg by mouth 3 (three) times daily.), Disp: 90 capsule, Rfl: 0    ibuprofen (ADVIL,MOTRIN) 400 MG tablet, Take 400 mg by mouth 3 (three) times daily., Disp: , Rfl:     lancets Misc, Use to check blood glucose 4 times daily, Disp: 100 each, Rfl: 5    levothyroxine (SYNTHROID) 75 MCG tablet, Take 1 tablet (75 mcg total) by mouth before breakfast., Disp: 90  tablet, Rfl: 1    losartan (COZAAR) 25 MG tablet, Take 25 mg by mouth once daily., Disp: , Rfl:     metFORMIN (GLUCOPHAGE-XR) 500 MG ER 24hr tablet, Take 500 mg by mouth 2 (two) times daily with meals., Disp: , Rfl:     metoprolol succinate (TOPROL-XL) 50 MG 24 hr tablet, Take 1 tablet (50 mg total) by mouth once daily., Disp: 30 tablet, Rfl: 0    multivitamin Tab, Take 1 tablet by mouth once daily., Disp: 30 tablet, Rfl: 0    OLANZapine (ZYPREXA) 7.5 MG tablet, Take 7.5 mg by mouth every evening., Disp: , Rfl:     omeprazole (PRILOSEC) 20 MG capsule, Take 1 capsule (20 mg total) by mouth once daily., Disp: 30 capsule, Rfl: 0    ondansetron (ZOFRAN) 4 MG tablet, Take 4 mg by mouth daily as needed for Nausea., Disp: , Rfl:     predniSONE (DELTASONE) 20 MG tablet, Take 20 mg by mouth once daily., Disp: , Rfl:     traZODone (DESYREL) 100 MG tablet, Take 2 tablets (200 mg total) by mouth every evening., Disp: 60 tablet, Rfl: 11  No current facility-administered medications for this encounter.    Facility-Administered Medications Ordered in Other Encounters:     albuterol sulfate nebulizer solution 2.5 mg, 2.5 mg, Nebulization, Once, Andrew Rodriguez MD    Allergies:  Lortab [hydrocodone-acetaminophen], Promethazine, and Albuterol    PSYCHOSOCIAL FACTORS:  Stressors (Biopsychosocial, Cultural and Environmental): marriage, physical health, substance use/addiction, grief  Functioning Relationships: Reports good relationship with her     STRENGTHS AND LIABILITIES:   Strength: Patient is intelligent.  Strength: Patient has positive support network.  Strength: Patient is stable.  Strength: Patient is accepting of treatment.  Liability: Patient has poor health.  Liability: Patient is cognitively impaired.    Additional Relevant History, As Applicable:       EXAMINATION:     There were no vitals taken for this visit.    MENTAL STATUS EXAMINATION:  General Appearance: adequately groomed, appropriately dressed, in no  "apparent distress  Behavior:  normal; cooperative; reasonably friendly, pleasant, and polite; appropriate eye-contact; under good behavioral control  Involuntary Movements and Motor Activity: no abnormal involuntary movements noted, no psychomotor agitation or retardation  Gait and Station:intact, normal gait and station, ambulates without assistance  Speech and Language: intact; normal rate, rhythm, volume, tone, and pitch; conversational, spontaneous, and coherent; speaks and understands English proficiently and fluently; repeats words and phrases, no word finding difficulties are noted  Mood: "lackadaisical"  Affect: reactive, appropriate   Thought Process and Associations: mostly goal-directed, tangential at times   Thought Content and Perceptions: no suicidal or homicidal ideation, no auditory or visual hallucinations, no paranoid ideation, no ideas of reference, no evidence of delusions or psychosis  Sensorium: Oriented to person, place, situation, month, and year.  Disoriented to day of month   Recent and Remote Memory: Impaired to some degree, 3/3 immediate recall, 1/3 delayed recall  Attention and Concentration: Able to spell WORLD forwards and backwards  Fund of Knowledge:Impaired to some degree, names 1/3 past presidents  Insight:intact, demonstrates awareness of illness and situation  Judgment:  intact, behavior is adequate/appropriate given the circumstances      RISK MANAGEMENT:     I[]I Y  I[x]I N  I[]I U  I[]I A  Suicidal Ideation/Behavior:   I[]I Y  I[x]I N  I[]I U  I[]I A  Homicidal Ideation/Behavior:  I[]I Y  I[x]I N  I[]I U  I[]I A  Violence:   I[]I Y  I[x]I N  I[]I U  I[]I A  Self-Injurious Behavior:     The patient is deemed to be a well-meaning but unreliable historian.    I[]I Y  I[]I N  I[]I U  I[]I A  I[x]I N/A  Minimization of Risk Parameters Suspected/Evident:   I[]I Y  I[]I N  I[]I U  I[]I A  I[x]I N/A  Exaggeration of Risk Parameters Suspected/Evident:       [] Y  [x] N  Danger to " Self:   [] Y  [x] N  Danger to Others:   [] Y  [x] N  Grave Disability:     In cases of emergency, daily coverage provided by Acute/ER Psych MD, NP, PA, or SW, with contact numbers located in [facility] On Call Schedule.    Monie Frazier MD, PhD  [facility]  HO-4  05/20/2024        A diagnostic psychiatric evaluation was performed and responsiveness to treatment was assessed.  The patient demonstrates adequate ability/capacity to respond to treatment.    KEY:     I[]I Y = Yes / Present / Endorses  I[]I N = No / Absent / Denies  I[]I U = Unknown / Unable to Assess / Unwilling to Participate  I[]I A = Ambiguity Exists / Accuracy Uncertain  I[]I D = Denial or Minimization is Suspected/Evident  I[]I N/A = Non-Applicable    CHART REVIEW:     Available documentation has been reviewed, and pertinent elements of the chart have been incorporated into this evaluation where appropriate.    LA/MS  AWARE  Site reviewed - No recent discrepancies or irregularities are noted.      ADVICE AND COUNSELING:     [x] In cases of emergencies (e.g. SI/HI resulting in danger to self or others, functioning deteriorates to the level of grave disability), call 911 or [phone], or present to the emergency department for immediate assistance.  [x] Individuals should not operate a motor vehicle or heavy machinery if effects of medications or underlying symptoms/condition impair the ability to safely do so.    Alcohol, Tobacco, and Drug Counseling, as well as resources, has been provided, as warranted.     Shared medical decision making and informed consent are the hallmark and bedrock of good clinical care, and as such have been employed and obtained, respectively, to the degree possible.      Risk Mitigation Strategies, Harm Reduction Techniques, and Safety Netting are important interventions that can reduce acute and chronic risk, and as such have been employed to the degree possible.    Prescription Drug  Management entails the review, recommendation, or consideration without recommendation of medications, and as such was employed during the encounter.    Additional Psychoeducation has been provided, as warranted.    Discussed, to the extent possible, diagnosis, risks and benefits of proposed treatment vs alternative treatments vs no treatment, potential side effects of these treatments and the inherent unpredictability of treatment. The patient expresses understanding of the above and displays the capacity to agree with this treatment given said understanding. Patient also agrees that, currently, the benefits outweigh the risks and consents to treatment at this time.     Written material has been provided to supplement, augment, and reinforce any discussions and interventions, via the AVS or other pre-printed handouts, as warranted.      DIAGNOSTIC TESTING:     The chart was reviewed for recent diagnostic procedures and investigations, and pertinent results are noted below.     Glu 179 (H)  5/17/2024  Li *   *  TSH 0.943  5/17/2024    HgA1c 6.0 (H)  2/11/2024  VPA *   *   FT4 1.12  3/1/2024    Na 140  5/17/2024  CLZ *   *  WBC 25.39 (H)  5/17/2024    Cr 0.9  5/17/2024  ANC 25.0 (L)  5/17/2024   Hgb 10.6 (L)  5/17/2024     BUN 14  5/17/2024  Trop I <0.006  4/13/2024  HCT 36.1 (L)  5/17/2024     GFR >60  5/17/2024   CPK 75  5/17/2024    5/17/2024     Alb 4.1  5/17/2024   PRL *   *  B12 372  5/17/2024     T Bili 0.4  5/17/2024  Chol 184  4/6/2023  B9 15.9  5/17/2024    ALP 61  5/17/2024  TGs 161 (H)  4/6/2023  B1 136 (H)  *    AST 26  5/17/2024  HDL 44  4/6/2023  Vit D *   *     ALT 38  5/17/2024  .8  4/6/2023  HIV Non-reactive  4/11/2024     INR 1.0  3/2/2024  [place] *   *   Hep C Non-reactive  4/11/2024    GGT *   *  Lip 16  4/13/2024  RPR *   *    MCV 86  5/17/2024   NH4 29  4/6/2023  UPT *   *      PETH 301  11/13/2023  THC Negative  4/11/2024    ETOH 112 (H)   4/13/2024  DESIREE Negative  4/11/2024    EtG Negative  11/13/2023  AMP Negative  4/11/2024    ALC 18 (A)  2/10/2024  OPI Negative  4/11/2024    BZO Negative  4/11/2024  MTD Negative  4/11/2024     BAR Negative  4/11/2024  BUP *   *    PCP Negative  4/11/2024  FEN Not Detected  7/18/2023     Results for orders placed or performed during the hospital encounter of 04/13/24   EKG 12-lead    Collection Time: 04/13/24  9:24 PM   Result Value Ref Range    QRS Duration 84 ms    [facility] QTC Calculation 433 ms    Narrative    Test Reason : R10.9,    Vent. Rate : 072 BPM     Atrial Rate : 072 BPM     P-R Int : 152 ms          QRS Dur : 084 ms      QT Int : 396 ms       P-R-T Axes : 071 079 070 degrees     QTc Int : 433 ms    Normal sinus rhythm  Normal ECG  When compared with ECG of 11-APR-2024 12:49,  Premature ventricular complexes are no longer Present  Confirmed by Sg STEPHENSON MD ([id number]) on 4/14/2024 9:24:50 AM    Referred By: [email or web address]   SELF           Confirmed By:Sg STEPHENSON MD       Results for orders placed or performed during the hospital encounter of 03/01/24   CT Head Without Contrast    Narrative    EXAMINATION:  CT HEAD WITHOUT CONTRAST    CLINICAL HISTORY:  Neuro deficit, acute, stroke suspected;left UE weakness started 1 week ago. She had Afib RVR y'day. CT head result will guide me to start anticoagulant. Thank you;    TECHNIQUE:  Low dose axial CT images obtained throughout the head without intravenous contrast. Sagittal and coronal reconstructions were performed.    COMPARISON:  CT head from 02/16/2024.    FINDINGS:  Ventricles and sulci are normal in size for age without evidence of hydrocephalus. No extra-axial blood or fluid collections.  Mild chronic microvascular ischemic changes, stable.  No parenchymal mass, hemorrhage, edema or major vascular distribution infarct.    No calvarial fracture.  The scalp is unremarkable.  Bilateral paranasal sinuses and mastoid air cells are clear.      Impression    No  acute intracranial abnormality.      Electronically signed by: Kaden Payne  Date:    03/03/2024  Time:    10:04   Results for orders placed or performed during the hospital encounter of 06/10/22   MRI Brain Without Contrast    Narrative    EXAMINATION:  MRI BRAIN WITHOUT CONTRAST    CLINICAL HISTORY:  hx of PRES;    TECHNIQUE:  Multiplanar multisequence MR imaging of the brain was performed without intravenous contrast.    COMPARISON:  Head CT 06/10/2022, brain MRI 10/04/2017, 07/21/2017    FINDINGS:  Intracranial Compartment:    Ventricles are normal in size for age without evidence of hydrocephalus.    Ill-defined focus T2-FLAIR signal hyperintensity in the right periatrial white matter.  Few additional scattered punctate foci elsewhere within the supratentorial white matter.  These appear similar to the prior study of 10/04/2017.  No definite new focal lesions.  No diffusion restriction to indicate an acute infarction.  No remote major vascular distribution infarct.  No recent or remote hemorrhage.  No mass effect or midline shift.    No extra-axial blood or fluid collections.    Normal vascular flow voids are preserved.    Skull/Extracranial Contents (limited evaluation):    Bone marrow signal intensity is normal.      Impression    No evidence of acute intracranial pathology.      Electronically signed by: Miguel Malagon MD  Date:    06/10/2022  Time:    09:45     *Note: Due to a large number of results and/or encounters for the requested time period, some results have not been displayed. A complete set of results can be found in Results Review.

## 2024-08-15 ENCOUNTER — TELEPHONE (OUTPATIENT)
Dept: BARIATRICS/WEIGHT MGMT | Age: 39
End: 2024-08-15

## 2024-08-29 ENCOUNTER — TELEPHONE (OUTPATIENT)
Dept: BARIATRICS/WEIGHT MGMT | Age: 39
End: 2024-08-29

## 2024-08-29 NOTE — TELEPHONE ENCOUNTER
Attempt for follow up call was made and letter to follow up for the second attempt has been mailed out to the patient on 8/29/2024.

## (undated) DEVICE — APPLICATOR MEDICATED 26 CC SOLUTION HI LT ORNG CHLORAPREP

## (undated) DEVICE — COVER,LIGHT HANDLE,FLX,1/PK: Brand: MEDLINE INDUSTRIES, INC.

## (undated) DEVICE — [HIGH FLOW INSUFFLATOR,  DO NOT USE IF PACKAGE IS DAMAGED,  KEEP DRY,  KEEP AWAY FROM SUNLIGHT,  PROTECT FROM HEAT AND RADIOACTIVE SOURCES.]: Brand: PNEUMOSURE

## (undated) DEVICE — SUTURE V-LOC 180 SZ 0 L9IN ABSRB GRN GS-21 L37MM 1/2 CIR VLOCL0346

## (undated) DEVICE — NDL CNTR 40CT FM MAG: Brand: MEDLINE INDUSTRIES, INC.

## (undated) DEVICE — STAPLER 60 RELOAD BLUE: Brand: SUREFORM

## (undated) DEVICE — PACK SURG LAP CHOLE CUSTOM

## (undated) DEVICE — CAMERA STRYKER 1488 HD GEN

## (undated) DEVICE — SYRINGE 20ML LL S/C 50

## (undated) DEVICE — PUMP SUC IRR TBNG L10FT W/ HNDPC ASSEMB STRYKEFLOW 2

## (undated) DEVICE — SYRINGE MED 20ML STD CLR PLAS LUERLOCK TIP N CTRL DISP

## (undated) DEVICE — TOWEL,OR,DSP,ST,BLUE,STD,6/PK,12PK/CS: Brand: MEDLINE

## (undated) DEVICE — SOLUTION IV IRRIG POUR BRL 0.9% SODIUM CHL 2F7124

## (undated) DEVICE — PERMANENT CAUTERY HOOK: Brand: ENDOWRIST

## (undated) DEVICE — Z INACTIVE USE 2660664 SOLUTION IRRIG 3000ML 0.9% SOD CHL USP UROMATIC PLAS CONT

## (undated) DEVICE — STAPLER SHEATH: Brand: ENDOWRIST

## (undated) DEVICE — GOWN,SIRUS,NONRNF,SETINSLV,XL,20/CS: Brand: MEDLINE

## (undated) DEVICE — ELECTRO LUBE IS A SINGLE PATIENT USE DEVICE THAT IS INTENDED TO BE USED ON ELECTROSURGICAL ELECTRODES TO REDUCE STICKING.: Brand: KEY SURGICAL ELECTRO LUBE

## (undated) DEVICE — PLUMEPORT LAPAROSCOPIC SMOKE FILTRATION DEVICE: Brand: PLUMEPORT ACTIV

## (undated) DEVICE — NEEDLE SPNL 22GA L3.5IN BLK HUB S STL REG WALL FIT STYL W/

## (undated) DEVICE — MARKER,SKIN,WI/RULER AND LABELS: Brand: MEDLINE

## (undated) DEVICE — Device: Brand: INSTRUSAFE

## (undated) DEVICE — STAPLER 45 RELOAD: Brand: ENDOWRIST

## (undated) DEVICE — COVER,TABLE,44X90,STERILE: Brand: MEDLINE

## (undated) DEVICE — SKIN AFFIX SURG ADHESIVE 72/CS 0.55ML: Brand: MEDLINE

## (undated) DEVICE — NEEDLE HYPO 25GA L1.5IN BLU POLYPR HUB S STL REG BVL STR

## (undated) DEVICE — STAPLER 60: Brand: SUREFORM

## (undated) DEVICE — TROCAR: Brand: KII FIOS FIRST ENTRY

## (undated) DEVICE — SEAL

## (undated) DEVICE — 20 ML SYRINGE REGULAR TIP: Brand: MONOJECT

## (undated) DEVICE — PMI PTFE COATED LAPAROSCOPIC WIRE L-HOOK 44 CM: Brand: PMI

## (undated) DEVICE — BLADELESS OBTURATOR: Brand: WECK VISTA

## (undated) DEVICE — TROCAR: Brand: KII SLEEVE

## (undated) DEVICE — DRAPE 64X41IN RADIOLOGY C ARM EQUIP STER

## (undated) DEVICE — KIT BEDSIDE REVITAL OX 500ML

## (undated) DEVICE — GARMENT,MEDLINE,DVT,INT,CALF,MED, GEN2: Brand: MEDLINE

## (undated) DEVICE — VESSEL SEALER EXTEND: Brand: ENDOWRIST

## (undated) DEVICE — GLOVE ORANGE PI 7 1/2   MSG9075

## (undated) DEVICE — MEDIA CONTRAST ISOVUE GLASS VIALS 250 50ML

## (undated) DEVICE — MEGA SUTURECUT ND: Brand: ENDOWRIST

## (undated) DEVICE — IRON INTERN

## (undated) DEVICE — DOUBLE BASIN SET: Brand: MEDLINE INDUSTRIES, INC.

## (undated) DEVICE — TOTAL TRAY, 16FR 10ML SIL FOLEY, URN: Brand: MEDLINE

## (undated) DEVICE — REDUCER: Brand: ENDOWRIST

## (undated) DEVICE — SUCTION IRRIGATOR: Brand: ENDOWRIST

## (undated) DEVICE — BLADE ES ELASTOMERIC COAT INSUL DURABLE BEND UPTO 90DEG

## (undated) DEVICE — ELECTRODE PT RET AD L9FT HI MOIST COND ADH HYDRGEL CORDED

## (undated) DEVICE — PMI PTFE COATED LAPAROSCOPIC WIRE L-HOOK 33 CM: Brand: PMI

## (undated) DEVICE — MICRO TIP WIPE: Brand: DEVON

## (undated) DEVICE — Z DISCONTINUED NO SUB IDED BAG SPEC RETRV M C240ML MOUTH 7.3MM L17CM SHFT 10MM NYL EZEE

## (undated) DEVICE — COOK ENDOSCOPIC CHOLANGIOGRAPHY SET: Brand: COOK

## (undated) DEVICE — TUBING, SUCTION, 1/4" X 10', STRAIGHT: Brand: MEDLINE

## (undated) DEVICE — NEEDLE LAP VERES 14 GAX120 MM IN124] THE OR COMPANY]

## (undated) DEVICE — SET ENDO INSTR LAPAROSCOPIC INCISIONAL

## (undated) DEVICE — SYRINGE MED 10ML TRNSLUC BRL PLUNG BLK MRK POLYPR CTRL

## (undated) DEVICE — GRADUATE

## (undated) DEVICE — SET INST DAVINCI XI ACCESSORIES

## (undated) DEVICE — SYRINGE MED 10ML LUERLOCK TIP W/O SFTY DISP

## (undated) DEVICE — SET ENDO INSTR RED YEL LAPAROSCOPIC

## (undated) DEVICE — LAPAROSCOPIC SCISSORS: Brand: EPIX LAPAROSCOPIC SCISSORS

## (undated) DEVICE — INSUFFLATION NEEDLE TO ESTABLISH PNEUMOPERITONEUM.: Brand: INSUFFLATION NEEDLE

## (undated) DEVICE — PATIENT RETURN ELECTRODE, SINGLE-USE, CONTACT QUALITY MONITORING, ADULT, WITH 9FT CORD, FOR PATIENTS WEIGING OVER 33LBS. (15KG): Brand: MEGADYNE

## (undated) DEVICE — CHLORAPREP 26ML ORANGE

## (undated) DEVICE — AIR SHEET,LAT,COMFORT GLIDE, BLEND 40X80: Brand: MEDLINE

## (undated) DEVICE — WARMER SCP LAP

## (undated) DEVICE — CANNULA SEAL

## (undated) DEVICE — APPLIER CLP M L L11.4IN DIA10MM ENDOSCP ROT MULT FOR LIG

## (undated) DEVICE — COLUMN DRAPE

## (undated) DEVICE — SHEET DRAPE FULL 70X100

## (undated) DEVICE — SUTURE ABSRB L6IN L37MM 0 GS-21 GRN 1/2 CIR TAPR PNT NDL VLOCL0306

## (undated) DEVICE — ARM DRAPE

## (undated) DEVICE — SCOPE DAVINCI XI 30 DEG W/CORD

## (undated) DEVICE — STAPLER 60 RELOAD WHITE: Brand: SUREFORM

## (undated) DEVICE — SHEET,DRAPE,40X58,STERILE: Brand: MEDLINE

## (undated) DEVICE — AIRLIFE™ ADULT OXYGEN MASK VINYL, UNDER THE CHIN STYLE, 3 IN 1 MASK WITH SAFETY VENT, WITH 7 FEET (2.1 M) CRUSH RESISTANT OXYGEN TUBING: Brand: AIRLIFE™

## (undated) DEVICE — TIP-UP FENESTRATED GRASPER: Brand: ENDOWRIST